# Patient Record
Sex: MALE | Race: BLACK OR AFRICAN AMERICAN | Employment: FULL TIME | URBAN - METROPOLITAN AREA
[De-identification: names, ages, dates, MRNs, and addresses within clinical notes are randomized per-mention and may not be internally consistent; named-entity substitution may affect disease eponyms.]

---

## 2017-01-12 ENCOUNTER — TELEPHONE (OUTPATIENT)
Dept: FAMILY MEDICINE | Facility: CLINIC | Age: 47
End: 2017-01-12

## 2017-01-12 NOTE — TELEPHONE ENCOUNTER
Pt calling to see if Sherron has found anything else out since having his testosterone levels drawn which were in the normal range.  Per results notes she was going to look into something.    Please advise    Charlette Duval RN, BSN

## 2017-01-17 NOTE — TELEPHONE ENCOUNTER
Was there suppose to be some follow up done with his testosterone in the normal limits?  Charlette Duval RN, BSN

## 2019-05-25 ENCOUNTER — VIRTUAL VISIT (OUTPATIENT)
Dept: FAMILY MEDICINE | Facility: OTHER | Age: 49
End: 2019-05-25

## 2019-05-28 ENCOUNTER — HOSPITAL ENCOUNTER (OUTPATIENT)
Facility: CLINIC | Age: 49
Setting detail: OBSERVATION
Discharge: HOME | End: 2019-05-30
Attending: HOSPITALIST | Admitting: UROLOGY
Payer: COMMERCIAL

## 2019-05-28 ENCOUNTER — TRANSFERRED RECORDS (OUTPATIENT)
Dept: HEALTH INFORMATION MANAGEMENT | Facility: CLINIC | Age: 49
End: 2019-05-28

## 2019-05-28 DIAGNOSIS — N23 RENAL COLIC: Primary | ICD-10-CM

## 2019-05-28 PROBLEM — N17.9 ACUTE KIDNEY INJURY (H): Status: ACTIVE | Noted: 2019-05-28

## 2019-05-28 LAB
CREAT SERPL-MCNC: 1.9 MG/DL (ref 0.66–1.25)
GFR SERPL CREATININE-BSD FRML MDRD: 38 ML/MIN/1.73M2
GLUCOSE SERPL-MCNC: 97 MG/DL (ref 74–106)
POTASSIUM SERPL-SCNC: 4.3 MMOL/L (ref 3.5–5.1)

## 2019-05-28 PROCEDURE — 99220 ZZC INITIAL OBSERVATION CARE,LEVL III: CPT | Performed by: PHYSICIAN ASSISTANT

## 2019-05-28 PROCEDURE — G0378 HOSPITAL OBSERVATION PER HR: HCPCS

## 2019-05-28 PROCEDURE — G0379 DIRECT REFER HOSPITAL OBSERV: HCPCS

## 2019-05-28 PROCEDURE — 25800030 ZZH RX IP 258 OP 636: Performed by: PHYSICIAN ASSISTANT

## 2019-05-28 PROCEDURE — 25000132 ZZH RX MED GY IP 250 OP 250 PS 637: Performed by: PHYSICIAN ASSISTANT

## 2019-05-28 RX ORDER — LIDOCAINE 40 MG/G
CREAM TOPICAL
Status: DISCONTINUED | OUTPATIENT
Start: 2019-05-28 | End: 2019-05-30 | Stop reason: HOSPADM

## 2019-05-28 RX ORDER — AMOXICILLIN 250 MG
1 CAPSULE ORAL 2 TIMES DAILY PRN
Status: DISCONTINUED | OUTPATIENT
Start: 2019-05-28 | End: 2019-05-30 | Stop reason: HOSPADM

## 2019-05-28 RX ORDER — SODIUM CHLORIDE 9 MG/ML
INJECTION, SOLUTION INTRAVENOUS CONTINUOUS
Status: DISCONTINUED | OUTPATIENT
Start: 2019-05-28 | End: 2019-05-29

## 2019-05-28 RX ORDER — TAMSULOSIN HYDROCHLORIDE 0.4 MG/1
0.4 CAPSULE ORAL DAILY
Status: DISCONTINUED | OUTPATIENT
Start: 2019-05-28 | End: 2019-05-29

## 2019-05-28 RX ORDER — ONDANSETRON 4 MG/1
4 TABLET, ORALLY DISINTEGRATING ORAL EVERY 6 HOURS PRN
Status: DISCONTINUED | OUTPATIENT
Start: 2019-05-28 | End: 2019-05-30 | Stop reason: HOSPADM

## 2019-05-28 RX ORDER — ACETAMINOPHEN 325 MG/1
650 TABLET ORAL EVERY 4 HOURS PRN
Status: DISCONTINUED | OUTPATIENT
Start: 2019-05-28 | End: 2019-05-30 | Stop reason: HOSPADM

## 2019-05-28 RX ORDER — ONDANSETRON 2 MG/ML
4 INJECTION INTRAMUSCULAR; INTRAVENOUS EVERY 6 HOURS PRN
Status: DISCONTINUED | OUTPATIENT
Start: 2019-05-28 | End: 2019-05-30 | Stop reason: HOSPADM

## 2019-05-28 RX ORDER — OXYCODONE HYDROCHLORIDE 5 MG/1
5-10 TABLET ORAL
Status: DISCONTINUED | OUTPATIENT
Start: 2019-05-28 | End: 2019-05-30 | Stop reason: HOSPADM

## 2019-05-28 RX ORDER — AMOXICILLIN 250 MG
2 CAPSULE ORAL 2 TIMES DAILY PRN
Status: DISCONTINUED | OUTPATIENT
Start: 2019-05-28 | End: 2019-05-30 | Stop reason: HOSPADM

## 2019-05-28 RX ORDER — HYDROMORPHONE HYDROCHLORIDE 1 MG/ML
0.5 INJECTION, SOLUTION INTRAMUSCULAR; INTRAVENOUS; SUBCUTANEOUS
Status: DISCONTINUED | OUTPATIENT
Start: 2019-05-28 | End: 2019-05-30 | Stop reason: HOSPADM

## 2019-05-28 RX ORDER — NALOXONE HYDROCHLORIDE 0.4 MG/ML
.1-.4 INJECTION, SOLUTION INTRAMUSCULAR; INTRAVENOUS; SUBCUTANEOUS
Status: DISCONTINUED | OUTPATIENT
Start: 2019-05-28 | End: 2019-05-30 | Stop reason: HOSPADM

## 2019-05-28 RX ORDER — POLYETHYLENE GLYCOL 3350 17 G/17G
17 POWDER, FOR SOLUTION ORAL DAILY PRN
Status: DISCONTINUED | OUTPATIENT
Start: 2019-05-28 | End: 2019-05-30 | Stop reason: HOSPADM

## 2019-05-28 RX ADMIN — OXYCODONE HYDROCHLORIDE 10 MG: 5 TABLET ORAL at 19:35

## 2019-05-28 RX ADMIN — OXYCODONE HYDROCHLORIDE 10 MG: 5 TABLET ORAL at 23:44

## 2019-05-28 RX ADMIN — SODIUM CHLORIDE: 9 INJECTION, SOLUTION INTRAVENOUS at 21:27

## 2019-05-28 ASSESSMENT — COLUMBIA-SUICIDE SEVERITY RATING SCALE - C-SSRS
1. IN THE PAST MONTH, HAVE YOU WISHED YOU WERE DEAD OR WISHED YOU COULD GO TO SLEEP AND NOT WAKE UP?: NO
2. HAVE YOU ACTUALLY HAD ANY THOUGHTS OF KILLING YOURSELF IN THE PAST MONTH?: NO

## 2019-05-28 NOTE — LETTER
[facility] OBSERVATION DEPARTMENT  [address]  [facility] [address]  [phone]          May 30, 2019    RE:  Russ Stevens                                                                                                                                                       [address]  [facility] [address]            To whom it may concern:    Russ Stevens is under my professional care for medical reasons from 5/28/19-5/30/19. He may return to work on 6/3/19.      Sincerely,    Faina Brar PA-C  [phone]

## 2019-05-29 ENCOUNTER — APPOINTMENT (OUTPATIENT)
Dept: GENERAL RADIOLOGY | Facility: CLINIC | Age: 49
End: 2019-05-29
Attending: UROLOGY
Payer: COMMERCIAL

## 2019-05-29 ENCOUNTER — ANESTHESIA (OUTPATIENT)
Dept: SURGERY | Facility: CLINIC | Age: 49
End: 2019-05-29
Payer: COMMERCIAL

## 2019-05-29 ENCOUNTER — ANESTHESIA EVENT (OUTPATIENT)
Dept: SURGERY | Facility: CLINIC | Age: 49
End: 2019-05-29
Payer: COMMERCIAL

## 2019-05-29 ENCOUNTER — SURGERY (OUTPATIENT)
Age: 49
End: 2019-05-29
Payer: COMMERCIAL

## 2019-05-29 ENCOUNTER — APPOINTMENT (OUTPATIENT)
Dept: GENERAL RADIOLOGY | Facility: CLINIC | Age: 49
End: 2019-05-29
Attending: HOSPITALIST
Payer: COMMERCIAL

## 2019-05-29 LAB
ALBUMIN UR-MCNC: NEGATIVE MG/DL
ANION GAP SERPL CALCULATED.3IONS-SCNC: 6 MMOL/L (ref 3–14)
APPEARANCE UR: CLEAR
BASOPHILS # BLD AUTO: 0 10E9/L (ref 0–0.2)
BASOPHILS NFR BLD AUTO: 0.3 %
BILIRUB UR QL STRIP: NEGATIVE
BUN SERPL-MCNC: 14 MG/DL (ref 7–30)
CALCIUM SERPL-MCNC: 8.4 MG/DL (ref 8.5–10.1)
CHLORIDE SERPL-SCNC: 112 MMOL/L (ref 94–109)
CO2 SERPL-SCNC: 25 MMOL/L (ref 20–32)
COLOR UR AUTO: NORMAL
CREAT SERPL-MCNC: 2.21 MG/DL (ref 0.66–1.25)
DIFFERENTIAL METHOD BLD: ABNORMAL
EOSINOPHIL # BLD AUTO: 0 10E9/L (ref 0–0.7)
EOSINOPHIL NFR BLD AUTO: 0.2 %
ERYTHROCYTE [DISTWIDTH] IN BLOOD BY AUTOMATED COUNT: 12.6 % (ref 10–15)
GFR SERPL CREATININE-BSD FRML MDRD: 34 ML/MIN/{1.73_M2}
GLUCOSE SERPL-MCNC: 86 MG/DL (ref 70–99)
GLUCOSE UR STRIP-MCNC: NEGATIVE MG/DL
HCT VFR BLD AUTO: 43.3 % (ref 40–53)
HGB BLD-MCNC: 14.2 G/DL (ref 13.3–17.7)
HGB UR QL STRIP: NEGATIVE
IMM GRANULOCYTES # BLD: 0 10E9/L (ref 0–0.4)
IMM GRANULOCYTES NFR BLD: 0.2 %
KETONES UR STRIP-MCNC: NEGATIVE MG/DL
LEUKOCYTE ESTERASE UR QL STRIP: NEGATIVE
LYMPHOCYTES # BLD AUTO: 1.6 10E9/L (ref 0.8–5.3)
LYMPHOCYTES NFR BLD AUTO: 23.8 %
MCH RBC QN AUTO: 30.2 PG (ref 26.5–33)
MCHC RBC AUTO-ENTMCNC: 32.8 G/DL (ref 31.5–36.5)
MCV RBC AUTO: 92 FL (ref 78–100)
MONOCYTES # BLD AUTO: 0.9 10E9/L (ref 0–1.3)
MONOCYTES NFR BLD AUTO: 13.2 %
NEUTROPHILS # BLD AUTO: 4.1 10E9/L (ref 1.6–8.3)
NEUTROPHILS NFR BLD AUTO: 62.3 %
NITRATE UR QL: NEGATIVE
NRBC # BLD AUTO: 0 10*3/UL
NRBC BLD AUTO-RTO: 0 /100
PH UR STRIP: 7 PH (ref 5–7)
PLATELET # BLD AUTO: 114 10E9/L (ref 150–450)
POTASSIUM SERPL-SCNC: 4.2 MMOL/L (ref 3.4–5.3)
RBC # BLD AUTO: 4.7 10E12/L (ref 4.4–5.9)
RBC #/AREA URNS AUTO: 1 /HPF (ref 0–2)
SODIUM SERPL-SCNC: 143 MMOL/L (ref 133–144)
SOURCE: NORMAL
SP GR UR STRIP: 1 (ref 1–1.03)
UROBILINOGEN UR STRIP-MCNC: NORMAL MG/DL (ref 0–2)
WBC # BLD AUTO: 6.5 10E9/L (ref 4–11)
WBC #/AREA URNS AUTO: 1 /HPF (ref 0–5)

## 2019-05-29 PROCEDURE — 37000009 ZZH ANESTHESIA TECHNICAL FEE, EACH ADDTL 15 MIN: Performed by: UROLOGY

## 2019-05-29 PROCEDURE — 99203 OFFICE O/P NEW LOW 30 MIN: CPT | Mod: 25 | Performed by: UROLOGY

## 2019-05-29 PROCEDURE — 96365 THER/PROPH/DIAG IV INF INIT: CPT

## 2019-05-29 PROCEDURE — 52332 CYSTOSCOPY AND TREATMENT: CPT | Mod: RT | Performed by: UROLOGY

## 2019-05-29 PROCEDURE — 71000012 ZZH RECOVERY PHASE 1 LEVEL 1 FIRST HR: Performed by: UROLOGY

## 2019-05-29 PROCEDURE — 25000128 H RX IP 250 OP 636: Performed by: NURSE ANESTHETIST, CERTIFIED REGISTERED

## 2019-05-29 PROCEDURE — 85025 COMPLETE CBC W/AUTO DIFF WBC: CPT | Performed by: PHYSICIAN ASSISTANT

## 2019-05-29 PROCEDURE — 25800030 ZZH RX IP 258 OP 636: Performed by: PHYSICIAN ASSISTANT

## 2019-05-29 PROCEDURE — 36000060 ZZH SURGERY LEVEL 3 W FLUORO 1ST 30 MIN: Performed by: UROLOGY

## 2019-05-29 PROCEDURE — 25500064 ZZH RX 255 OP 636: Performed by: UROLOGY

## 2019-05-29 PROCEDURE — 40000306 ZZH STATISTIC PRE PROC ASSESS II: Performed by: UROLOGY

## 2019-05-29 PROCEDURE — 87086 URINE CULTURE/COLONY COUNT: CPT | Performed by: UROLOGY

## 2019-05-29 PROCEDURE — 40000936 ZZH STATISTIC OUTPATIENT (NON-OBS) NIGHT

## 2019-05-29 PROCEDURE — C2617 STENT, NON-COR, TEM W/O DEL: HCPCS | Performed by: UROLOGY

## 2019-05-29 PROCEDURE — 36000058 ZZH SURGERY LEVEL 3 EA 15 ADDTL MIN: Performed by: UROLOGY

## 2019-05-29 PROCEDURE — 25000128 H RX IP 250 OP 636: Performed by: PHYSICIAN ASSISTANT

## 2019-05-29 PROCEDURE — 25000125 ZZHC RX 250: Performed by: NURSE ANESTHETIST, CERTIFIED REGISTERED

## 2019-05-29 PROCEDURE — 99225 ZZC SUBSEQUENT OBSERVATION CARE,LEVEL II: CPT | Performed by: PHYSICIAN ASSISTANT

## 2019-05-29 PROCEDURE — 74420 UROGRAPHY RTRGR +-KUB: CPT | Mod: 26 | Performed by: UROLOGY

## 2019-05-29 PROCEDURE — 40000277 XR SURGERY CARM FLUORO LESS THAN 5 MIN W STILLS: Mod: TC

## 2019-05-29 PROCEDURE — 25000128 H RX IP 250 OP 636: Performed by: UROLOGY

## 2019-05-29 PROCEDURE — 37000008 ZZH ANESTHESIA TECHNICAL FEE, 1ST 30 MIN: Performed by: UROLOGY

## 2019-05-29 PROCEDURE — 25800029 ZZH RX IP 258 OP 250: Performed by: PHYSICIAN ASSISTANT

## 2019-05-29 PROCEDURE — 96366 THER/PROPH/DIAG IV INF ADDON: CPT | Mod: 59

## 2019-05-29 PROCEDURE — 25000132 ZZH RX MED GY IP 250 OP 250 PS 637: Performed by: PHYSICIAN ASSISTANT

## 2019-05-29 PROCEDURE — G0378 HOSPITAL OBSERVATION PER HR: HCPCS

## 2019-05-29 PROCEDURE — 81001 URINALYSIS AUTO W/SCOPE: CPT | Performed by: PHYSICIAN ASSISTANT

## 2019-05-29 PROCEDURE — 36415 COLL VENOUS BLD VENIPUNCTURE: CPT | Performed by: PHYSICIAN ASSISTANT

## 2019-05-29 PROCEDURE — 27210794 ZZH OR GENERAL SUPPLY STERILE: Performed by: UROLOGY

## 2019-05-29 PROCEDURE — 25000125 ZZHC RX 250: Performed by: UROLOGY

## 2019-05-29 PROCEDURE — 80048 BASIC METABOLIC PNL TOTAL CA: CPT | Performed by: PHYSICIAN ASSISTANT

## 2019-05-29 PROCEDURE — C1769 GUIDE WIRE: HCPCS | Performed by: UROLOGY

## 2019-05-29 PROCEDURE — 74019 RADEX ABDOMEN 2 VIEWS: CPT

## 2019-05-29 DEVICE — STENT URETERAL POLARIS ULTRA 6FRX26CM M0061921330: Type: IMPLANTABLE DEVICE | Site: URETER | Status: FUNCTIONAL

## 2019-05-29 RX ORDER — LIDOCAINE HYDROCHLORIDE 10 MG/ML
INJECTION, SOLUTION INFILTRATION; PERINEURAL PRN
Status: DISCONTINUED | OUTPATIENT
Start: 2019-05-29 | End: 2019-05-29

## 2019-05-29 RX ORDER — CEFTRIAXONE 1 G/1
1 INJECTION, POWDER, FOR SOLUTION INTRAMUSCULAR; INTRAVENOUS EVERY 24 HOURS
Status: DISCONTINUED | OUTPATIENT
Start: 2019-05-29 | End: 2019-05-30 | Stop reason: HOSPADM

## 2019-05-29 RX ORDER — CEFAZOLIN SODIUM 1 G/50ML
1 INJECTION, SOLUTION INTRAVENOUS SEE ADMIN INSTRUCTIONS
Status: DISCONTINUED | OUTPATIENT
Start: 2019-05-29 | End: 2019-05-29

## 2019-05-29 RX ORDER — SODIUM CHLORIDE 9 MG/ML
INJECTION, SOLUTION INTRAVENOUS CONTINUOUS
Status: DISCONTINUED | OUTPATIENT
Start: 2019-05-29 | End: 2019-05-29

## 2019-05-29 RX ORDER — DIMENHYDRINATE 50 MG/ML
25 INJECTION, SOLUTION INTRAMUSCULAR; INTRAVENOUS
Status: DISCONTINUED | OUTPATIENT
Start: 2019-05-29 | End: 2019-05-29 | Stop reason: HOSPADM

## 2019-05-29 RX ORDER — MEPERIDINE HYDROCHLORIDE 50 MG/ML
12.5 INJECTION INTRAMUSCULAR; INTRAVENOUS; SUBCUTANEOUS
Status: DISCONTINUED | OUTPATIENT
Start: 2019-05-29 | End: 2019-05-29 | Stop reason: HOSPADM

## 2019-05-29 RX ORDER — ALBUTEROL SULFATE 0.83 MG/ML
2.5 SOLUTION RESPIRATORY (INHALATION) EVERY 4 HOURS PRN
Status: DISCONTINUED | OUTPATIENT
Start: 2019-05-29 | End: 2019-05-29 | Stop reason: HOSPADM

## 2019-05-29 RX ORDER — SODIUM CHLORIDE, SODIUM LACTATE, POTASSIUM CHLORIDE, CALCIUM CHLORIDE 600; 310; 30; 20 MG/100ML; MG/100ML; MG/100ML; MG/100ML
INJECTION, SOLUTION INTRAVENOUS CONTINUOUS
Status: DISCONTINUED | OUTPATIENT
Start: 2019-05-29 | End: 2019-05-29 | Stop reason: HOSPADM

## 2019-05-29 RX ORDER — DEXAMETHASONE SODIUM PHOSPHATE 4 MG/ML
INJECTION, SOLUTION INTRA-ARTICULAR; INTRALESIONAL; INTRAMUSCULAR; INTRAVENOUS; SOFT TISSUE PRN
Status: DISCONTINUED | OUTPATIENT
Start: 2019-05-29 | End: 2019-05-29

## 2019-05-29 RX ORDER — CEFAZOLIN SODIUM 2 G/100ML
2 INJECTION, SOLUTION INTRAVENOUS
Status: DISCONTINUED | OUTPATIENT
Start: 2019-05-29 | End: 2019-05-29

## 2019-05-29 RX ORDER — FENTANYL CITRATE 50 UG/ML
25-50 INJECTION, SOLUTION INTRAMUSCULAR; INTRAVENOUS
Status: DISCONTINUED | OUTPATIENT
Start: 2019-05-29 | End: 2019-05-29 | Stop reason: HOSPADM

## 2019-05-29 RX ORDER — GLYCOPYRROLATE 0.2 MG/ML
INJECTION, SOLUTION INTRAMUSCULAR; INTRAVENOUS PRN
Status: DISCONTINUED | OUTPATIENT
Start: 2019-05-29 | End: 2019-05-29

## 2019-05-29 RX ORDER — CEFAZOLIN SODIUM 2 G/100ML
INJECTION, SOLUTION INTRAVENOUS PRN
Status: DISCONTINUED | OUTPATIENT
Start: 2019-05-29 | End: 2019-05-29

## 2019-05-29 RX ORDER — ONDANSETRON 2 MG/ML
4 INJECTION INTRAMUSCULAR; INTRAVENOUS EVERY 30 MIN PRN
Status: DISCONTINUED | OUTPATIENT
Start: 2019-05-29 | End: 2019-05-29 | Stop reason: HOSPADM

## 2019-05-29 RX ORDER — METOPROLOL TARTRATE 1 MG/ML
1-2 INJECTION, SOLUTION INTRAVENOUS EVERY 5 MIN PRN
Status: DISCONTINUED | OUTPATIENT
Start: 2019-05-29 | End: 2019-05-29 | Stop reason: HOSPADM

## 2019-05-29 RX ORDER — ONDANSETRON 4 MG/1
4 TABLET, ORALLY DISINTEGRATING ORAL EVERY 30 MIN PRN
Status: DISCONTINUED | OUTPATIENT
Start: 2019-05-29 | End: 2019-05-29 | Stop reason: HOSPADM

## 2019-05-29 RX ORDER — PROPOFOL 10 MG/ML
INJECTION, EMULSION INTRAVENOUS PRN
Status: DISCONTINUED | OUTPATIENT
Start: 2019-05-29 | End: 2019-05-29

## 2019-05-29 RX ORDER — SODIUM CHLORIDE 450 MG/100ML
INJECTION, SOLUTION INTRAVENOUS CONTINUOUS
Status: DISCONTINUED | OUTPATIENT
Start: 2019-05-29 | End: 2019-05-30 | Stop reason: HOSPADM

## 2019-05-29 RX ORDER — NALOXONE HYDROCHLORIDE 0.4 MG/ML
.1-.4 INJECTION, SOLUTION INTRAMUSCULAR; INTRAVENOUS; SUBCUTANEOUS
Status: DISCONTINUED | OUTPATIENT
Start: 2019-05-29 | End: 2019-05-29 | Stop reason: HOSPADM

## 2019-05-29 RX ORDER — FENTANYL CITRATE 50 UG/ML
INJECTION, SOLUTION INTRAMUSCULAR; INTRAVENOUS PRN
Status: DISCONTINUED | OUTPATIENT
Start: 2019-05-29 | End: 2019-05-29

## 2019-05-29 RX ORDER — LIDOCAINE 40 MG/G
CREAM TOPICAL
Status: DISCONTINUED | OUTPATIENT
Start: 2019-05-29 | End: 2019-05-30 | Stop reason: HOSPADM

## 2019-05-29 RX ORDER — HYDROMORPHONE HYDROCHLORIDE 1 MG/ML
.3-.5 INJECTION, SOLUTION INTRAMUSCULAR; INTRAVENOUS; SUBCUTANEOUS EVERY 10 MIN PRN
Status: DISCONTINUED | OUTPATIENT
Start: 2019-05-29 | End: 2019-05-29 | Stop reason: HOSPADM

## 2019-05-29 RX ADMIN — SODIUM CHLORIDE: 9 INJECTION, SOLUTION INTRAVENOUS at 10:30

## 2019-05-29 RX ADMIN — CEFAZOLIN SODIUM 2 G: 2 INJECTION, SOLUTION INTRAVENOUS at 11:59

## 2019-05-29 RX ADMIN — MIDAZOLAM 2 MG: 1 INJECTION INTRAMUSCULAR; INTRAVENOUS at 11:43

## 2019-05-29 RX ADMIN — SODIUM CHLORIDE: 9 INJECTION, SOLUTION INTRAVENOUS at 03:59

## 2019-05-29 RX ADMIN — OXYCODONE HYDROCHLORIDE 10 MG: 5 TABLET ORAL at 04:02

## 2019-05-29 RX ADMIN — SODIUM CHLORIDE: 4.5 INJECTION, SOLUTION INTRAVENOUS at 14:02

## 2019-05-29 RX ADMIN — ONDANSETRON 4 MG: 2 INJECTION INTRAMUSCULAR; INTRAVENOUS at 12:04

## 2019-05-29 RX ADMIN — CEFTRIAXONE SODIUM 1 G: 1 INJECTION, POWDER, FOR SOLUTION INTRAMUSCULAR; INTRAVENOUS at 15:14

## 2019-05-29 RX ADMIN — FENTANYL CITRATE 100 MCG: 50 INJECTION, SOLUTION INTRAMUSCULAR; INTRAVENOUS at 11:51

## 2019-05-29 RX ADMIN — WATER 15 ML GIVEN: 100 IRRIGANT IRRIGATION at 12:04

## 2019-05-29 RX ADMIN — DEXAMETHASONE SODIUM PHOSPHATE 4 MG: 4 INJECTION, SOLUTION INTRA-ARTICULAR; INTRALESIONAL; INTRAMUSCULAR; INTRAVENOUS; SOFT TISSUE at 11:51

## 2019-05-29 RX ADMIN — CEFAZOLIN SODIUM 2 G: 2 INJECTION, SOLUTION INTRAVENOUS at 11:55

## 2019-05-29 RX ADMIN — PROPOFOL 150 MG: 10 INJECTION, EMULSION INTRAVENOUS at 11:51

## 2019-05-29 RX ADMIN — LIDOCAINE HYDROCHLORIDE 30 MG: 10 INJECTION, SOLUTION INFILTRATION; PERINEURAL at 11:51

## 2019-05-29 RX ADMIN — OXYCODONE HYDROCHLORIDE 10 MG: 5 TABLET ORAL at 09:07

## 2019-05-29 RX ADMIN — TAMSULOSIN HYDROCHLORIDE 0.4 MG: 0.4 CAPSULE ORAL at 08:50

## 2019-05-29 RX ADMIN — GLYCOPYRROLATE 0.2 MG: 0.2 INJECTION, SOLUTION INTRAMUSCULAR; INTRAVENOUS at 11:51

## 2019-05-29 ASSESSMENT — MIFFLIN-ST. JEOR: SCORE: 1744.89

## 2019-05-29 NOTE — PROGRESS NOTES
"Pt arrived back on unit from procedure BP 97/56 (BP Location: Left arm)   Pulse 76   Temp 98.4  F (36.9  C) (Oral)   Resp 24   Ht 1.753 m (5' 9\")   Wt 88.5 kg (195 lb)   SpO2 95%   BMI 28.80 kg/m    "

## 2019-05-29 NOTE — PLAN OF CARE
PRIMARY DIAGNOSIS: ACUTE RENAL COLIC    OUTPATIENT/OBSERVATION GOALS TO BE MET BEFORE DISCHARGE  1. Pain Status: Improved-controlled with oral pain medications.    2. Tolerating adequate PO diet: NPO, ice chips/meds overnight.     3. Surgical Intervention planned: consult pending    4. Cleared by consultants (if involved): No    5. Return to near baseline physical activity: Yes    Discharge Planner Nurse   Safe discharge environment identified: Yes  Barriers to discharge: Yes       Entered by: Estrella Ochoa 05/29/2019 1:39 AM     Please review provider order for any additional goals.   Nurse to notify provider when observation goals have been met and patient is ready for discharge.    VSS. Afebrile. 9/10 right flank pain radiating to abdomen. Well controlled with oral pain medication. Oxycodone x1. Heat to back. Denies nausea. Straining urine. No stones noted. Up independently.

## 2019-05-29 NOTE — ANESTHESIA CARE TRANSFER NOTE
Patient: Russ Stevens    Procedure(s):  Cystoscopy, with Right Ureteroscopy with holmiun laser on standby, Right stent insertion.    Diagnosis: kidney stone  Diagnosis Additional Information: No value filed.    Anesthesia Type:   General, ETT     Note:  Airway :Face Mask  Patient transferred to:PACU  Comments:   Spontaneous respirations, oral suctioned, bilateral eye opening and hand grasps.  Extubated to FM O2 6lpm.  VSS to PACU.Handoff Report: Identifed the Patient, Identified the Reponsible Provider, Reviewed the pertinent medical history, Discussed the surgical course, Reviewed Intra-OP anesthesia mangement and issues during anesthesia, Set expectations for post-procedure period and Allowed opportunity for questions and acknowledgement of understanding      Vitals: (Last set prior to Anesthesia Care Transfer)    CRNA VITALS  5/29/2019 1143 - 5/29/2019 1223      5/29/2019             NIBP:  128/78    Pulse:  79    SpO2:  99 %    Resp Rate (observed):  16    EKG:  Sinus rhythm                Electronically Signed By: JASEN Bay CRNA  May 29, 2019  12:23 PM

## 2019-05-29 NOTE — OP NOTE
OPERATIVE NOTE    PREOPERATIVE DIAGNOSIS:  Right ureteral stone    POSTOPERATIVE DIAGNOSIS:  Same    PROCEDURES PERFORMED:   1. Cystourethroscopy  2. Right retrograde pyelogram with interpretation of intraoperative fluoroscopic imaging  3. Right ureteral stent placement    STAFF SURGEON:  Grey Gaspar MD  ASSISTANT:   Damion Cueto MD    ANESTHESIA:  General    ESTIMATED BLOOD LOSS: 1 cc    DRAINS/TUBES:  6 [place] x 26 cm double-J ureteral stent    SPECIMEN:   Urine for culture    SIGNIFICANT FINDINGS: Turbid, purulent drainage from ureter upon passage of wire concerning for infection. Stent placed; ureteroscopy not performed.  Proximal curl of the ureteral stent seen in the renal pelvis under fluoroscopy and distal curl seen in the bladder fluoroscopically and under direct vision.     BRIEF OPERATIVE INDICATIONS:  Russ Stevens is a(n) 48 year old male with a symptomatic 2mm right distal ureteral stone and ILIANA.  After a discussion of all risks, benefits, and alternatives, the patient elected to proceed with ureteroscopy and ureteral stent placement.     DESCRIPTION OF PROCEDURE:  After informed consent was obtained, the patient was transported to the operating room & placed supine on the table. Pneumoboots were applied.  After adequate anesthesia was induced, he was placed in lithotomy and prepped and draped in the usual sterile fashion. A timeout was taken to confirm correct patient, procedure and laterality. Pre-operative IV antibiotics were administered.     A 22-[place] Korean rigid cystoscope was inserted into a well-lubricated urethra. The anterior urethra was unremarkable, and the prostate showed no obstruction. The bladder was surveilled and was unremarkable.  The right ureteral orifice was identified and cannulated with a Glid wire and 5 [place] open-ended catheter. A retrograde pyelogram was obtained which showed mild hydronephrosis. There was drainage of turbid, purulent urine after passage of the wire  concerning for infection. We therefore elected not to proceed with ureteroscopy and to place a stent only. The wire was replaced and a 6 Fr x 26-cm double-J stent was advanced over the wire. Good proximal curl was seen in the renal pelvis fluoroscopically and the distal curl was seen in the bladder fluoroscopically and under direct vision.     The bladder was drained.  The patient tolerated the procedure well and there were no apparent complications. The patient  was transported to the postanesthesia care unit in stable condition.     POST-OPERATIVE PLAN: Following recovery in the PACU, the patient will return to the floor.  Recommend observation x24 hours on broad spectrum antibiotics (ceftriaxone 1g ordered) given evidence of infection with stent placement. Will schedule in 2-3 weeks for ureteroscopy after completion of at least 7-10 days of culture appropriate antibiotics.

## 2019-05-29 NOTE — PLAN OF CARE
Patient No change    PRIMARY DIAGNOSIS: ACUTE RENAL COLIC    OUTPATIENT/OBSERVATION GOALS TO BE MET BEFORE DISCHARGE  1. Pain Status: Improved-controlled with oral pain medications.    2. Tolerating adequate PO diet: Yes    3. Surgical Intervention planned: Urology consult    4. Cleared by consultants (if involved): No    5. Return to near baseline physical activity: Yes    Vitals are Temp: 97  F (36.1  C) Temp src: Oral BP: 129/87 Pulse: 77   Resp: 18 SpO2: 96 %.  Patient is Alert and Oriented x4. They are independent with Activity.  Pt is a Regular diet and NPO .  They are complaining of 9/10 pain in their right side.  Oxycodone given for pain.  Patient has no IV access and attempting to get IV access.  We are straining the urine.  Plan of care is a urology consult.    Discharge Planner Nurse   Safe discharge environment identified: Yes  Barriers to discharge: Yes            Please review provider order for any additional goals.   Nurse to notify provider when observation goals have been met and patient is ready for discharge.

## 2019-05-29 NOTE — PLAN OF CARE
PRIMARY DIAGNOSIS: ACUTE RENAL COLIC    OUTPATIENT/OBSERVATION GOALS TO BE MET BEFORE DISCHARGE  1. Pain Status: Improved-controlled with oral pain medications.    2. Tolerating adequate PO diet: NPO, ice chips/meds overnight.     3. Surgical Intervention planned: consult pending    4. Cleared by consultants (if involved): No    5. Return to near baseline physical activity: Yes    Discharge Planner Nurse   Safe discharge environment identified: Yes  Barriers to discharge: Yes       Entered by: Estrella Ochoa 05/29/2019 4:28 AM     Please review provider order for any additional goals.   Nurse to notify provider when observation goals have been met and patient is ready for discharge.    VSS. Afebrile. 9/10 right flank pain radiating to abdomen. Well controlled with oral pain medication. Oxycodone x2. Heat to back. Denies nausea. Straining urine. No stones noted. Up independently.

## 2019-05-29 NOTE — PROGRESS NOTES
"[facility]  Observation Unit  Progress Note    Date of Service: 5/29/2019    Patient: Russ Stevens  MRN: [id number]  Admission Date: 5/28/2019  Hospital Day # 2    Assessment & Plan: Russ Stevens is a 48 year old male with no significant past medical history who presented as a direct admission from the Urgency Room due to ILIANA and Nephrolithiasis.    Acute Renal Colic - pt presented to the Urgency Room on 5/25 with right flank pain.  CT abd/pelvis revealed 2mm calculus in the distal right ureter with right hydronephrosis and right hydroureter, non obstructing stone in the right and left kidney.  Creatinine 1.3.  Patient returned to the UR on 5/28 with ongoing pain and rising creatinine to 1.9 and negative UA.  This morning, patient with rising creatinine to 2.21 and negative UA.  Urology consulted and patient is s/p Cystourethroscopy, Right Retrograde Pyelogram and Right Ureteral Stent placement.  Turbid, purulent drainage from the ureter was noted during the procedure, therefore IV Ceftriaxone and return to the observation unit and monitoring overnight was recommended.  - continue IVF hydration  - urine culture pending  - continue IV Ceftriaxone    ILIANA - 2/2 obstructing stone.  Creatinine today 2.21 up from baseline 1.06  - continue IVF hydration  - repeat BMP in am       CODE: Full  DVT: ambulation  Diet/fluids: regular, 1/2NS    Aarti Flores MS, PA-C  Hospitalist Physician Assistant  [facility]  Pager: [phone]      Subjective & Interval Hx:    Patient reports ongoing right flank pain.  Denies nausea or emesis.      Last 24 hr care team notes reviewed.   ROS:  4 point ROS including Respiratory, CV, GI and , other than that noted in the HPI, is negative.    Physical Exam:    Blood pressure 121/76, pulse 68, temperature (P) 97.4  F (36.3  C), temperature source (P) Temporal, resp. rate 18, height 1.753 m (5' 9\"), weight 88.5 kg (195 lb), SpO2 95 %.  General: Alert, interactive, " NAD, lying in bed, pleasant and cooperative.  Resp: clear to auscultation bilaterally, no crackles or wheezes  Cardiac: regular rate and rhythm, no murmur  Abdomen: Soft, right flank tenderness, nondistended. +BS.  No HSM or masses, no rebound or guarding.  Extremities: No LE edema  Skin: Warm and dry  Neuro: Alert & oriented x 3, moves all extremities equally    Labs & Images:  Reviewed in Epic   Medications:    Current Facility-Administered Medications   Medication     [Auto Hold] acetaminophen (TYLENOL) tablet 650 mg     albuterol (PROVENTIL) neb solution 2.5 mg     ceFAZolin (ANCEF) intermittent infusion 1 g     ceFAZolin (ANCEF) intermittent infusion 2 g in 100 mL dextrose PRE-MIX     dimenhyDRINATE (DRAMAMINE) injection 25 mg     fentaNYL (PF) (SUBLIMAZE) injection 25-50 mcg     fentaNYL (PF) (SUBLIMAZE) injection 25-50 mcg     HYDROmorphone (PF) (DILAUDID) injection 0.3-0.5 mg     [Auto Hold] HYDROmorphone (PF) (DILAUDID) injection 0.5 mg     iopamidol 61% (ISOVUE 300) 50 mL + sterile water for irrigation 50 mL     lactated ringers infusion     lidocaine (LMX4) cream     [Auto Hold] lidocaine (LMX4) cream     lidocaine 1 % 0.1-1 mL     [Auto Hold] lidocaine 1 % 0.1-1 mL     [Auto Hold] melatonin tablet 1 mg     meperidine (DEMEROL) injection 12.5 mg     metoprolol (LOPRESSOR) injection 1-2 mg     naloxone (NARCAN) injection 0.1-0.4 mg     [Auto Hold] naloxone (NARCAN) injection 0.1-0.4 mg     ondansetron (ZOFRAN-ODT) ODT tab 4 mg    Or     ondansetron (ZOFRAN) injection 4 mg     [Auto Hold] ondansetron (ZOFRAN-ODT) ODT tab 4 mg    Or     [Auto Hold] ondansetron (ZOFRAN) injection 4 mg     ORAL Pain Medications -  may administer as ordered by surgeon for take home use     [Auto Hold] oxyCODONE (ROXICODONE) tablet 5-10 mg     [Auto Hold] polyethylene glycol (MIRALAX/GLYCOLAX) Packet 17 g     prochlorperazine (COMPAZINE) injection 10 mg     [Auto Hold] senna-docusate (SENOKOT-S/PERICOLACE) 8.6-50 MG per tablet 1  tablet    Or     [Auto Hold] senna-docusate (SENOKOT-S/PERICOLACE) 8.6-50 MG per tablet 2 tablet     sodium chloride (PF) 0.9% PF flush 3 mL     sodium chloride (PF) 0.9% PF flush 3 mL     [Auto Hold] sodium chloride (PF) 0.9% PF flush 3 mL     [Auto Hold] sodium chloride (PF) 0.9% PF flush 3 mL     sodium chloride 0.9% infusion     sodium chloride 0.9% infusion     [Auto Hold] tamsulosin (FLOMAX) capsule 0.4 mg     Facility-Administered Medications Ordered in Other Encounters   Medication     ceFAZolin (ANCEF) intermittent infusion 2 g in 100 mL dextrose PRE-MIX     dexamethasone (DECADRON) injection     fentaNYL (PF) (SUBLIMAZE) injection     glycopyrrolate (ROBINUL) injection     lidocaine 1 % injection     midazolam (VERSED) injection     propofol (DIPRIVAN) injection 10 mg/mL vial

## 2019-05-29 NOTE — PLAN OF CARE
PRIMARY DIAGNOSIS: ACUTE RENAL COLIC    OUTPATIENT/OBSERVATION GOALS TO BE MET BEFORE DISCHARGE  1. Pain Status: Improved-controlled with oral pain medications.    2. Tolerating adequate PO diet: NPO for procedure     3. Surgical Intervention planned: Yes    4. Cleared by consultants (if involved): No    5. Return to near baseline physical activity: Yes    Discharge Planner Nurse   Safe discharge environment identified: Yes  Barriers to discharge: Yes       Entered by: Ashanti Peters 05/29/2019    AOx4. Up ad eric. Pain tolerable with PRN oxy. Fluids at 150. Straining urine. Febrile-100.3 o/w VSS. NPO for urological procedure today-OR time of 1100.   /70 (BP Location: Left arm)   Pulse 68   Temp 100.6  F (38.1  C) (Oral)   Resp 18   SpO2 97%   Please review provider order for any additional goals.   Nurse to notify provider when observation goals have been met and patient is ready for discharge.

## 2019-05-29 NOTE — PLAN OF CARE
"PRIMARY DIAGNOSIS: s/p cysto with stent placement  OUTPATIENT/OBSERVATION GOALS TO BE MET BEFORE DISCHARGE:  1. Stable vital signs: Yes  2. Tolerating diet: Yes  3. Pain controlled with oral pain medications: Yes  4. Positive bowel sounds: Yes  5. Voiding without difficulty: Yes, reported mild dysuria, voided 250mL with christa-colored urine output  6. Able to ambulate: Yes  7. Provider specific discharge goals met: Yes    Discharge Planner Nurse   Safe discharge environment identified: Yes  Barriers to discharge: No       Entered by: Jasmyn Givens 05/29/2019 4:47 PM     Please review provider order for any additional goals.   Nurse to notify provider when observation goals have been met and patient is ready for discharge.    BP 97/53 (BP Location: Left arm)   Pulse 76   Temp 97.3  F (36.3  C) (Oral)   Resp 20   Ht 1.753 m (5' 9\")   Wt 88.5 kg (195 lb)   SpO2 93%   BMI 28.80 kg/m      Orientation: A&Ox4  Neurological: in tact  Pain status: denies pain; however, reports some pain/burning when voiding  Activity: SBA for long distances, safe to ambulate independently in room  Peripheral neurovascular: no edema noted in BLE, denies numbness/tingling BUE/BLE  Resp: no signs of respiratory distress ntoed  Lungs: clear on auscultation  Cardiac: WNL  GI: WNL - bowel sounds present in all 4Qs  : voiding adequately, reporting some mild discomfort/dysuria when voiding, christa/red-colored output  Skin: in tact  IVF: 1/2 NS 100mL/hr  Diet: regular - tolerating  Consults: urology following  Plan: IV rocephin, continue IVF, repeat BMP in AM, pain control  "

## 2019-05-29 NOTE — PHARMACY-ADMISSION MEDICATION HISTORY
Medication Reconciliation is completed.  Patient is currently not taking any medications prior to this admission.                   May 28, 2019                    Angel Wu

## 2019-05-29 NOTE — PLAN OF CARE
ROOM # 208-2    Living Situation (if not independent, order SW consult): home alone in apt  Facility name:  :   [facility] Spouse [phone]         Activity level at baseline: Ind  Activity level on admit: Ind      Patient registered to observation; given Patient Bill of Rights; given the opportunity to ask questions about observation status and their plan of care.  Patient has been oriented to the observation room, bathroom and call light is in place.    Discussed discharge goals and expectations with patient/family.

## 2019-05-29 NOTE — ANESTHESIA POSTPROCEDURE EVALUATION
Patient: Russ Stevens    Procedure(s):  Cystoscopy, with Right Ureteroscopy with holmiun laser on standby, Right stent insertion.    Diagnosis:kidney stone  Diagnosis Additional Information:            Right ureteral stone    Anesthesia Type:  General, ETT    Note:  Anesthesia Post Evaluation    Patient location during evaluation: PACU  Patient participation: Able to fully participate in evaluation  Level of consciousness: awake  Pain management: adequate  Airway patency: patent  Cardiovascular status: acceptable  Respiratory status: acceptable  Hydration status: acceptable  PONV: controlled     Anesthetic complications: None          Last vitals:  Vitals:    05/29/19 1245 05/29/19 1300 05/29/19 1334   BP: 124/86 (!) 134/93 97/56   Pulse:      Resp: 13 15 24   Temp:  97.5  F (36.4  C) 98.4  F (36.9  C)   SpO2: 100% 97% 95%         Electronically Signed By: Jayjay Daily DO  May 29, 2019  1:37 PM

## 2019-05-29 NOTE — CONSULTS
"Consult Date:  05/29/2019      UROLOGY INPATIENT CONSULTATION      REASON FOR CONSULTATION:  Right ureteral stone and right kidney stone.      HISTORY OF PRESENT ILLNESS:  Russ Stevens is a 48-year-old gentleman with no prior stone history who was the \"urgency room\" in [place] 4 days ago with right-sided flank pain.  A CT scan performed in the urgency room showed a 2 mm distal right ureteral stone.  There was also no stone up in his right kidney.  He was discharged with pain medication.  He said that his pain remained resolved after that visit until yesterday morning when it returned.  He came back to the Emergency Department here and then was admitted for pain control.  He has continued to require pain control in the observation unit overnight.  He has had no fevers, chills, nausea or vomiting.  He had a normal white blood cell count upon admission, and an updated urinalysis is pending.      PAST MEDICAL HISTORY:  He has no prior history of kidney stones.      PAST SURGICAL HISTORY:  No prior surgeries.        HOME MEDICATIONS:  He normally takes no medications at home.      ALLERGIES:  NO KNOWN DRUG ALLERGIES.      SOCIAL HISTORY:  He is a nonsmoker.      FAMILY HISTORY:  No family history of kidney stones or urologic malignancy.      REVIEW OF SYSTEMS:  Negative other than the right flank pain.      PHYSICAL EXAMINATION:   VITAL SIGNS:  He is currently afebrile and his vital signs are stable.   GENERAL:  Currently alert and oriented and in no acute distress.   HEENT:  Normocephalic and atraumatic.   RESPIRATORY:  Normal nonlabored breathing.   ABDOMEN:  Soft, nontender, nondistended.      IMAGING STUDIES:  I only have the report at this time from the urgency room, but reported a 2 mm stone in the distal right ureter and a 7 mm stone in the right kidney.      IMPRESSION AND PLAN:  This is a 48-year-old gentleman with a small distal right ureteral stone and another stone in the right kidney.  We discussed trial of " passage versus treating the stone for a distal right ureteral stone.  He has had a strong recurrence of his pain and it has been 4 days since diagnosis, so he would like to have the stone treated.  I recommended a cystoscopy with right-sided ureteroscopy and stone extraction and stent placement.  I discussed the procedure in detail today along with its risks and expected recovery.  We will try to get a urinalysis today and also a KUB today.  The remaining right kidney stone may be amenable to shock wave lithotripsy in the future.  He will be n.p.o. in anticipation of a procedure later today.         DAKOTA VALDIVIA MD             D: 2019   T: 2019   MT: KELSEY      Name:     ANNE-MARIE CROW   MRN:      [id number]        Account:       [id number]   :      1970           Consult Date:  2019      Document: [id number]

## 2019-05-29 NOTE — ANESTHESIA PREPROCEDURE EVALUATION
Anesthesia Pre-Procedure Evaluation    Patient: Russ Stevens   MRN: [id number] : 1970          Preoperative Diagnosis: kidney stone    Procedure(s):  Cystoscopy, Right Ureteroscopy with holmiun laser,Right stent insertion.    Past Medical History:   Diagnosis Date     NO ACTIVE PROBLEMS      Past Surgical History:   Procedure Laterality Date     [facility] H[facility] -       [facility]     SURGICAL HISTORY OF -       rt knee surgery     Anesthesia Evaluation     .             ROS/MED HX    ENT/Pulmonary:  - neg pulmonary ROS     Neurologic:  - neg neurologic ROS     Cardiovascular:  - neg cardiovascular ROS       METS/Exercise Tolerance:     Hematologic:  - neg hematologic  ROS       Musculoskeletal:  - neg musculoskeletal ROS       GI/Hepatic:  - neg GI/hepatic ROS       Renal/Genitourinary:     (+) Nephrolithiasis ,       Endo: Comment: .Body mass index is 28.8 kg/m .   - neg endo ROS       Psychiatric:  - neg psychiatric ROS       Infectious Disease:  - neg infectious disease ROS       Malignancy:         Other:    - neg other ROS                      Physical Exam  Normal systems: cardiovascular and pulmonary    Airway   Mallampati: II    Dental     Cardiovascular   Rhythm and rate: regular and normal      Pulmonary    breath sounds clear to auscultation            Lab Results   Component Value Date    WBC 6.5 2019    HGB 14.2 2019    HCT 43.3 2019     (L) 2019     2019    POTASSIUM 4.2 2019    CHLORIDE 112 (H) 2019    CO2 25 2019    BUN 14 2019    CR 2.21 (H) 2019    GLC 86 2019    SMITA 8.4 (L) 2019    ALBUMIN 4.4 2010    PROTTOTAL 7.3 2010    ALT 30 2010    AST 27 2010    ALKPHOS 110 2010    BILITOTAL 0.5 2010    INR 0.91 2010    TSH 0.86 2010       Preop Vitals  BP Readings from Last 3 Encounters:   19 118/70   16 100/60   11/18/15 122/82    Pulse  "Readings from Last 3 Encounters:   05/29/19 68   12/30/16 78   08/20/15 73      Resp Readings from Last 3 Encounters:   05/29/19 18   08/20/15 18   02/01/13 18    SpO2 Readings from Last 3 Encounters:   05/29/19 97%   12/30/16 98%   08/20/15 98%      Temp Readings from Last 1 Encounters:   05/29/19 100.6  F (38.1  C) (Oral)    Ht Readings from Last 1 Encounters:   12/30/16 1.778 m (5' 10\")      Wt Readings from Last 1 Encounters:   12/30/16 102.2 kg (225 lb 3.2 oz)    Estimated body mass index is 32.31 kg/m  as calculated from the following:    Height as of 12/30/16: 1.778 m (5' 10\").    Weight as of 12/30/16: 102.2 kg (225 lb 3.2 oz).       Anesthesia Plan      History & Physical Review  History and physical reviewed and following examination; no interval change.    ASA Status:  1 .        Plan for General and ETT with Intravenous and Propofol induction. Maintenance will be Inhalation and Balanced.    PONV prophylaxis:  Ondansetron (or other 5HT-3) and Dexamethasone or Solumedrol       Postoperative Care      Consents  Anesthetic plan, risks, benefits and alternatives discussed with:  Patient or representative..                 Jayjay Daily DO                    .  "

## 2019-05-29 NOTE — H&P
[facility] Outpatient / Observation Unit  History and Physical Exam     Russ Stevens MRN# [id number]   YOB: 1970 Age: 48 year old      Date of Admission:  5/28/2019    Primary care provider: No primary care provider on file.          Assessment:   Russ Stevens is a 48 year old male with no significant PMH, who presents with complaints of R flank pain. He was diagnosed with a 2 mm distal R ureteral stone on 5/25.  Work up in the Urgency Room reveals: stable VS. Cr elevated at 1.9, otherwise unremarkable. CBC is unremarkable. UA unremarkable.   Patient will be registered to Observation for further evaluation and symptom management for acute renal colic.     1. Acute renal colic - 2 mm R distal ureteral stone, dx on 5/25, pain resolved at home on 5/26 but returned this morning and has been constant. Denies fever, dysuria or hematuria. Good chance of passing stone. Aggressive IVFs, pain control, Flomax and Urology consult.   2. ILIANA - Cr 1.3 on 5/25, now 1.9. Kidney stone is obstructing causing hydronephrosis. Tolerating PO intake, able to urinate. Will give IVFs and recheck in AM           Plan:     1. [place] to Observation  2. Aggressive IV hydration with Normal saline, @, 150 ml/hr   3. Cont supportive care with anti-emetics and pain control (no NSAIDs)  4. Strain Urine, send stone for analysis if applicable  5. Initiate Flomax  6. Urology consultation  7. Regular diet, NPO after midnight  8. DVT prophylaxis: pt at low risk, encourage ambulation                Chief Complaint:   R flank pain         History of Present Illness:   Russ Stevens is a 48 year old male with no significant PMH, who presents with complaints of R flank pain. He was diagnosed with a 2 mm distal R ureteral stone on 5/25. Patient presented to U[facility]ency Room on 5/25 due to right flank pain. He was diagnosed with a 2 mm R distal ureteral stone with hydronephrosis. He was discharged home with pain medication, antiemetics and Flomax. His pain  went away on 5/26 but returned again this morning and has remained constant. He notes having to give extra pressure to urinate but denies any other problems with urination. He denies fever, chills, chest pain, SOB, nausea, vomiting, diarrhea, constipation, dysuria or hematuria. He notes mild intermittent generalized abd pain. He returned to U[facility]ency Room due to sx. His Cr was elevated today on evaluation prompting admission to the hospital.                   Past Medical History:     Past Medical History:   Diagnosis Date     NO ACTIVE PROBLEMS    no significant PMHx            Past Surgical History:     Past Surgical History:   Procedure Laterality Date     SURGICAL HISTORY OF -       cyst removal from [place]     SURGICAL HISTORY OF -       rt knee surgery               Social History:     Social History     Socioeconomic History     Marital status:      Spouse name: Not on file     Number of children: Not on file     Years of education: Not on file     Highest education level: Not on file   Occupational History     Not on file   Social Needs     Financial resource strain: Not on file     Food insecurity:     Worry: Not on file     Inability: Not on file     Transportation needs:     Medical: Not on file     Non-medical: Not on file   Tobacco Use     Smoking status: Never Smoker     Smokeless tobacco: Never Used   Substance and Sexual Activity     Alcohol use: Yes     Comment: rare     Drug use: No     Sexual activity: Yes     Partners: Female     Birth control/protection: Surgical     Comment: vasectomy   Lifestyle     Physical activity:     Days per week: Not on file     Minutes per session: Not on file     Stress: Not on file   Relationships     Social connections:     Talks on phone: Not on file     Gets together: Not on file     Attends Christian service: Not on file     Active member of club or organization: Not on file     Attends meetings of clubs or organizations: Not on file     Relationship  status: Not on file     Intimate partner violence:     Fear of current or ex partner: Not on file     Emotionally abused: Not on file     Physically abused: Not on file     Forced sexual activity: Not on file   Other Topics Concern     Parent/sibling w/ CABG, MI or angioplasty before 65F 55M? No   Social History Narrative     Not on file               Family History:     Family History   Problem Relation Age of Onset     Heart Disease Maternal Grandfather      Family History Negative Mother      Family History Negative Father          in MVA     Family History Negative Brother         4              Allergies:      Allergies   Allergen Reactions     No Known Allergies                Medications:     Oxycodone 5 mg 1-2 tablets PO every 4 hrs as needed for pain  zofran 4 mg PO every 6 hrs as needed for nausea  Flomax 0.4 mg PO daily  Ketorolac 10 mg PO every 6 hrs as needed for pain         Review of Systems:   A Comprehensive greater than 10 system review of systems was carried out.  Pertinent positives and negatives are noted above.  Otherwise negative for contributory information.     Constitutional, neuro, ENT, endocrine, pulmonary, cardiac, gastrointestinal, genitourinary, musculoskeletal, integument and psychiatric systems are negative, except as otherwise noted.         Physical Exam:   Blood pressure 129/87, pulse 77, temperature 97  F (36.1  C), temperature source Oral, resp. rate 18, SpO2 96 %.    GENERAL:  Comfortable.  PSYCH: pleasant, oriented, No acute distress.  HEENT:  Atraumatic, normocephalic. Normal conjunctiva, normal hearing, and oropharynx is normal.  NECK:  Supple, no neck vein distention  HEART:  Normal S1, S2 with no murmur, no pericardial rub, gallops or S3 or S4.  LUNGS:  Clear to auscultation, normal Respiratory effort. No wheezing, rales or ronchi.  GI:  Soft, normal bowel sounds. R CVA and flank tenderness, non distended.   EXTREMITIES:  Able to ambulate independently  SKIN:  Dry to  touch, No rash, wound or ulcerations.  NEUROLOGIC:  CN 2-12 intact, BL 5/5 symmetric upper and lower extremity strength, sensation is intact with no focal deficits.              Data:     Labs and imaging reviewed from [facility]ency Room    Estrella Moseley PA-C

## 2019-05-30 VITALS
HEIGHT: 69 IN | BODY MASS INDEX: 28.88 KG/M2 | HEART RATE: 73 BPM | DIASTOLIC BLOOD PRESSURE: 74 MMHG | SYSTOLIC BLOOD PRESSURE: 120 MMHG | RESPIRATION RATE: 20 BRPM | WEIGHT: 195 LBS | OXYGEN SATURATION: 97 % | TEMPERATURE: 96.6 F

## 2019-05-30 LAB
ANION GAP SERPL CALCULATED.3IONS-SCNC: 4 MMOL/L (ref 3–14)
BACTERIA SPEC CULT: NO GROWTH
BUN SERPL-MCNC: 15 MG/DL (ref 7–30)
CALCIUM SERPL-MCNC: 8.8 MG/DL (ref 8.5–10.1)
CHLORIDE SERPL-SCNC: 108 MMOL/L (ref 94–109)
CO2 SERPL-SCNC: 29 MMOL/L (ref 20–32)
CREAT SERPL-MCNC: 1.41 MG/DL (ref 0.66–1.25)
GFR SERPL CREATININE-BSD FRML MDRD: 58 ML/MIN/{1.73_M2}
GLUCOSE SERPL-MCNC: 91 MG/DL (ref 70–99)
POTASSIUM SERPL-SCNC: 4 MMOL/L (ref 3.4–5.3)
SODIUM SERPL-SCNC: 141 MMOL/L (ref 133–144)
SPECIMEN SOURCE: NORMAL

## 2019-05-30 PROCEDURE — G0378 HOSPITAL OBSERVATION PER HR: HCPCS

## 2019-05-30 PROCEDURE — 96366 THER/PROPH/DIAG IV INF ADDON: CPT

## 2019-05-30 PROCEDURE — 99217 ZZC OBSERVATION CARE DISCHARGE: CPT | Performed by: PHYSICIAN ASSISTANT

## 2019-05-30 PROCEDURE — 25000128 H RX IP 250 OP 636: Performed by: UROLOGY

## 2019-05-30 PROCEDURE — 80048 BASIC METABOLIC PNL TOTAL CA: CPT | Performed by: PHYSICIAN ASSISTANT

## 2019-05-30 PROCEDURE — 36415 COLL VENOUS BLD VENIPUNCTURE: CPT | Performed by: PHYSICIAN ASSISTANT

## 2019-05-30 RX ORDER — SULFAMETHOXAZOLE/TRIMETHOPRIM 800-160 MG
1 TABLET ORAL 2 TIMES DAILY
Qty: 20 TABLET | Refills: 0 | Status: SHIPPED | OUTPATIENT
Start: 2019-05-30 | End: 2019-06-13

## 2019-05-30 RX ADMIN — CEFTRIAXONE SODIUM 1 G: 1 INJECTION, POWDER, FOR SOLUTION INTRAMUSCULAR; INTRAVENOUS at 16:24

## 2019-05-30 NOTE — CONSULTS
Discharge Planner   Discharge Plans in progress: Yes.   Barriers to discharge plan: Patient is currently not established with a PCP. Patient is agreeable to scheduling with [facility].   Follow up plan: Appointment scheduled to establish care and for post hospitalization and updated to AVS.        Entered by: Arabella Harris 05/30/2019 2:16 PM       Arabella TONY  Care Transition Services  [phone]

## 2019-05-30 NOTE — PLAN OF CARE
PRIMARY DIAGNOSIS: ACUTE RENAL COLIC    OUTPATIENT/OBSERVATION GOALS TO BE MET BEFORE DISCHARGE  1. Pain Status: Pain free.    2. Tolerating adequate PO diet: Yes    3. Surgical Intervention planned: Completed     4. Cleared by consultants (if involved): Yes    5. Return to near baseline physical activity: Yes    Discharge Planner Nurse   Safe discharge environment identified: Yes  Barriers to discharge: No       Entered by: Darien Quiros 05/30/2019 5:10 AM     Please review provider order for any additional goals.   Nurse to notify provider when observation goals have been met and patient is ready for discharge.  Temp: 96.9  F (36.1  C) Temp src: Oral BP: 120/80 Pulse: 76 Heart Rate: 58 Resp: 12 SpO2: 98 % O2 Device: None (Room air) Oxygen Delivery: 6 LPM  Pt A&Ox4. States pain with urination. Denies N/V/D, numbness, or tingling. Straining urine. PIV- 1/2 NS infusing @ 100 mL/hr. Creatinine- 2.21. UA negative.

## 2019-05-30 NOTE — PLAN OF CARE
PRIMARY DIAGNOSIS: ACUTE RENAL COLIC    OUTPATIENT/OBSERVATION GOALS TO BE MET BEFORE DISCHARGE  1. Pain Status: Pain free.    2. Tolerating adequate PO diet: Yes    3. Surgical Intervention planned: Completed     4. Cleared by consultants (if involved): Yes    5. Return to near baseline physical activity: Yes    Patient is alert and oriented x4. VSS. Endures pain only with urination. Straining urine. CR 2.21, fluids overnight running at 100 mL/hr. Regular diet tolerated. Urology consulted. Ind in room.    Discharge Planner Nurse   Safe discharge environment identified: Yes  Barriers to discharge: No       Entered by: Megan Ayers 05/30/2019 1:44 AM     Please review provider order for any additional goals.   Nurse to notify provider when observation goals have been met and patient is ready for discharge.

## 2019-05-30 NOTE — PROGRESS NOTES
Urology Note    Patient seen on afternoon rounds. We discussed the findings from yesterday's surgery, namely the infected urine and placement of a stent only instead of ureteroscopy. We discussed the importance of completing his full course of antibiotics and that we will schedule him in 2-3 weeks for ureteroscopy. Okay for discharge today from a urology standpoint with Bactrim DS BID x10 days.    Damion Cueto MD  Urology PGY5  Pager [phone]

## 2019-05-30 NOTE — PLAN OF CARE
Patient's After Visit Summary was reviewed with patient and family.   Patient verbalized understanding of After Visit Summary, recommended follow up and was given an opportunity to ask questions.   Discharge medications sent home with patient/family: YES, bactrim.     Discharged with family member.    Patient discharged in stable condition with family member as ride. Picked up medications being stored in pharmacy before leaving. AVS thoroughly reviewed; instructed to call urology tomorrow to scheduled follow up. PCP follow up already scheduled for June 13th.     OBSERVATION patient END time: 1645

## 2019-05-30 NOTE — PLAN OF CARE
"PRIMARY DIAGNOSIS: s/p cysto with stent placement  OUTPATIENT/OBSERVATION GOALS TO BE MET BEFORE DISCHARGE:  1. Stable vital signs: Yes  2. Tolerating diet: Yes  3. Pain controlled with oral pain medications: Yes  4. Positive bowel sounds: Yes  5. Voiding without difficulty: Yes  6. Able to ambulate: Yes  7. Provider specific discharge goals met: Yes    Discharge Planner Nurse   Safe discharge environment identified: Yes  Barriers to discharge: No       Entered by: Jakob Wagner 05/30/2019 8:34 AM     Please review provider order for any additional goals.   Nurse to notify provider when observation goals have been met and patient is ready for discharge.    /83 (BP Location: Right arm)   Pulse 57   Temp 96.2  F (35.7  C) (Oral)   Resp 12   Ht 1.753 m (5' 9\")   Wt 88.5 kg (195 lb)   SpO2 96%   BMI 28.80 kg/m      Pt denying pain, no concerns noted at this time. Tolerating regular diet. Creatinine now 1.41 this AM. Will continue to monitor.   "

## 2019-05-30 NOTE — PLAN OF CARE
"PRIMARY DIAGNOSIS: s/p cysto with stent placement  OUTPATIENT/OBSERVATION GOALS TO BE MET BEFORE DISCHARGE:  1. Stable vital signs: Yes  2. Tolerating diet: Yes  3. Pain controlled with oral pain medications: Yes  4. Positive bowel sounds: Yes  5. Voiding without difficulty: Yes  6. Able to ambulate: Yes  7. Provider specific discharge goals met: Yes    Discharge Planner Nurse   Safe discharge environment identified: Yes  Barriers to discharge: No       Entered by: Jakob Wagner 05/30/2019    Please review provider order for any additional goals.   Nurse to notify provider when observation goals have been met and patient is ready for discharge.    /52 (BP Location: Right arm)   Pulse 67   Temp 98  F (36.7  C) (Oral)   Resp 12   Ht 1.753 m (5' 9\")   Wt 88.5 kg (195 lb)   SpO2 100%   BMI 28.80 kg/m      Pt denying pain, no concerns noted at this time. Tolerating regular diet. Awaiting urology to see patient for discharge. Will continue to monitor.   "

## 2019-05-30 NOTE — DISCHARGE SUMMARY
[facility] Observation Unit Discharge Summary          Russ Stevens MRN# [id number]   Age: 48 year old YOB: 1970     Date of Admission:  5/28/2019  Date of Discharge::  5/30/2019  Admitting Physician:  Tamir Connell MD  Discharge Physician:  Aarti Flores PA-C  Primary Physician: [facility]     Primary Discharge Diagnoses:   Acute Renal Colic  ILIANA     Hospital Course:   For detail history, please refer to H & P from 5/28/2019. In brief, this is a 48 year old male with no significant past medical history who presented as a direct admission from the Urgency Room due to ILIANA and Nephrolithiasis.     Acute Renal Colic - pt presented to the Urgency Room on 5/25 with right flank pain.  CT abd/pelvis revealed 2mm calculus in the distal right ureter with right hydronephrosis and right hydroureter, non obstructing stone in the right and left kidney.  Creatinine 1.3.  Patient returned to the UR on 5/28 with ongoing pain and rising creatinine to 1.9 and negative UA.  On 5/29, patient with rising creatinine to 2.21 and negative UA.  Urology was consulted and patient is s/p Cystourethroscopy, Right Retrograde Pyelogram and Right Ureteral Stent placement.  Turbid, purulent drainage from the ureter was noted during the procedure, therefore IV Ceftriaxone was started and return to the observation unit and monitoring overnight.  Patient's creatinine improved the day of discharge.  His urine culture was negative.  Urology recommended Bactrim DS BID x10 days and follow up in 2-3 weeks for ureteroscopy.     ILIANA - 2/2 obstructing stone.  Creatinine peaked at 2.21, down to 1.41 the day of discharge.  Patient's baseline is 1.06.  Anticipate ongoing improvement of creatinine.  Patient recommended to follow up with his pcp within one week for repeat bmp.    Procedures/Imaging:     Results for orders placed or performed during the hospital encounter of 05/28/19   XR KUB    Narrative     "XR KUB 5/29/2019 8:14 AM    HISTORY: Kidney stone.    COMPARISON: None.    FINDINGS: There are 2 densities in the left hemipelvis measuring 0.5  cm and 0.3 cm respectively. Smaller density in the contralateral right  hemipelvis measures 0.2 cm. Small density in the projection of the  right kidney measures 0.3 cm. No specific evidence of left renal  stone.      Impression    IMPRESSION: Densities in the pelvis, particularly on the left, could  be intraureteral.    PATTI MATHIS MD   XR Surgery PAOLA Fluoro L/T 5 Min w Stills    Narrative    This exam was marked as non-reportable because it will not be read by a   radiologist or a [facility] non-radiologist provider.               Consultations:   Urology    Code Status:   Full    Allergies:     Allergies   Allergen Reactions     No Known Allergies         Subjective:   Patient reports his abdomen is sore, but denies acute pain that     Physical Exam:   Blood pressure 109/52, pulse 67, temperature 98  F (36.7  C), temperature source Oral, resp. rate 12, height 1.753 m (5' 9\"), weight 88.5 kg (195 lb), SpO2 100 %. on room air.  General: Alert, interactive, NAD  Resp: clear to auscultation bilaterally, no crackles or wheezes  Cardiac: regular rate and rhythm, no murmur  Abdomen: Soft, nontender, nondistended. +BS.  No rebound or guarding.  Extremities: No LE edema  Neuro: Alert & oriented x 3, moves all extremities equally   Discharge Medicatios:        Discharge Medication List as of 5/30/2019  4:35 PM      START taking these medications    Details   sulfamethoxazole-trimethoprim (BACTRIM DS/SEPTRA DS) 800-160 MG tablet Take 1 tablet by mouth 2 times daily for 10 days, Disp-20 tablet, R-0, E-Prescribe             Instructions Given to Patient as Discharge:     Discharge Procedure Orders   Reason for your hospital stay   Order Comments: You were hospitalized due to acute kidney injury and kidney stones.     Follow-up and recommended labs and tests    Order Comments: Please " follow up with PCP within one week and Urology as instructed.     Activity   Order Comments: Your activity upon discharge: activity as tolerated     Order Specific Question Answer Comments   Is discharge order? Yes      When to contact your care team   Order Comments: Notify for fever >101.5; black or bloody stools; severe, persistent, or worsening nausea, vomiting, abdominal pain or distention, diarrhea, constipation; chest pain, difficulty breathing, swelling; dizziness, weakness, lightheadedness, fainting.  Proceed to the nearest emergency room for any urgent concerns.     Diet   Order Comments: Follow this diet upon discharge: Orders Placed This Encounter      Regular Diet Adult     Order Specific Question Answer Comments   Is discharge order? Yes        Pending Tests at Discharge:   none    Discharge Disposition:   Discharged to home     Aarti LOERAC  Hospitalist Service  Pager [phone]    >30 minutes was spent in discharge planning, care coordination, physical examination and medication reconciliation on the date of discharge, 5/30/2019

## 2019-06-03 ENCOUNTER — TELEPHONE (OUTPATIENT)
Dept: FAMILY MEDICINE | Facility: CLINIC | Age: 49
End: 2019-06-03

## 2019-06-03 NOTE — TELEPHONE ENCOUNTER
ED / Discharge Outreach Protocol    Patient Contact    Attempt # 1    Was call answered?  No.  Unable to leave message.  No VM set up     Leeann Maxwell RN

## 2019-06-03 NOTE — TELEPHONE ENCOUNTER
ALLIERER on 5/30/19 for Renal Colic  Patient has an appointment with BONITA Boateng on 6/13/19 to est care and post hospital visit.  Haleigh Sylvester

## 2019-06-04 ENCOUNTER — TELEPHONE (OUTPATIENT)
Dept: UROLOGY | Facility: CLINIC | Age: 49
End: 2019-06-04

## 2019-06-04 NOTE — TELEPHONE ENCOUNTER
ED / Discharge Outreach Protocol    Patient Contact    Attempt # 2    Was call answered?  No.  Unable to leave message.    Charlette Duval RN, BSN

## 2019-06-04 NOTE — TELEPHONE ENCOUNTER
Patient is calling saying he is having some blood in his urine and was concerned. I explained that they have placed a stent and he will notice some blood in the urine until the stent is removed.   Voiced understanding.  Radha DOVER  [facility]  June 4, 2019 10:02 AM

## 2019-06-05 NOTE — TELEPHONE ENCOUNTER
ED / Discharge Outreach Protocol    Patient Contact    Attempt # 3    Was call answered?  No.  Unable to leave message.    Pt does have OV with Moe Boateng on 6/13    Leeann Maxwell RN

## 2019-06-06 NOTE — TELEPHONE ENCOUNTER
"Hospital/TCU/ED for chronic condition Discharge Protocol    \"Hi, my name is Leeann TOMACarisa Maxwell, a registered nurse, and I am calling from [facility].  I am calling to follow up and see how things are going for you after your recent emergency visit/hospital/TCU stay.\"    Tell me how you are doing now that you are home?\" I was ok until I went back to work\"  He did start to have to have more blood in urine and pain.  He will contact urology for this       Discharge Instructions    \"Let's review your discharge instructions.  What is/are the follow-up recommendations?  Pt. Response: f/u with PCP and urology     \"Has an appointment with your primary care provider been scheduled?\"   Yes. (confirm)    \"When you see the provider, I would recommend that you bring your medications with you.\"    Medications    \"Tell me what changed about your medicines when you discharged?\"    Changes to chronic meds?    0-1    \"What questions do you have about your medications?\"    None     New diagnoses of heart failure, COPD, diabetes, or MI?    No              Medication reconciliation completed? Yes  Was MTM referral placed (*Make sure to put transitions as reason for referral)?   No    Call Summary    \"What questions or concerns do you have about your recent visit and your follow-up care?\"       If bleeding worsens or pain returns f/u with urology.  Bring any paperwork he needs filled out to OV with [facility]    Pt expressed understanding and acceptance of the plan.  Pt had no further questions at this time.  Advised can call back to clinic at any time with concerns.       \"If you have questions or things don't continue to improve, we encourage you contact us through the main clinic number (give number).  Even if the clinic is not open, triage nurses are available 24/7 to help you.     We would like you to know that our clinic has extended hours (provide information).  We also have urgent care (provide details on closest location and " "hours/contact info)\"      \"Thank you for your time and take care!\"       Leeann Maxwell RN      "

## 2019-06-13 ENCOUNTER — OFFICE VISIT (OUTPATIENT)
Dept: FAMILY MEDICINE | Facility: CLINIC | Age: 49
End: 2019-06-13
Payer: COMMERCIAL

## 2019-06-13 VITALS
RESPIRATION RATE: 16 BRPM | SYSTOLIC BLOOD PRESSURE: 107 MMHG | DIASTOLIC BLOOD PRESSURE: 73 MMHG | HEART RATE: 86 BPM | TEMPERATURE: 98.7 F | BODY MASS INDEX: 30.42 KG/M2 | WEIGHT: 206 LBS

## 2019-06-13 DIAGNOSIS — N23 RENAL COLIC: Primary | ICD-10-CM

## 2019-06-13 DIAGNOSIS — N17.9 ACUTE KIDNEY INJURY (H): ICD-10-CM

## 2019-06-13 LAB
ALBUMIN UR-MCNC: >=300 MG/DL
ANION GAP SERPL CALCULATED.3IONS-SCNC: 10 MMOL/L (ref 3–14)
APPEARANCE UR: CLEAR
BILIRUB UR QL STRIP: NEGATIVE
BUN SERPL-MCNC: 22 MG/DL (ref 7–30)
CALCIUM SERPL-MCNC: 8.6 MG/DL (ref 8.5–10.1)
CHLORIDE SERPL-SCNC: 109 MMOL/L (ref 94–109)
CO2 SERPL-SCNC: 23 MMOL/L (ref 20–32)
COLOR UR AUTO: ABNORMAL
CREAT SERPL-MCNC: 1.31 MG/DL (ref 0.66–1.25)
GFR SERPL CREATININE-BSD FRML MDRD: 64 ML/MIN/{1.73_M2}
GLUCOSE SERPL-MCNC: 85 MG/DL (ref 70–99)
GLUCOSE UR STRIP-MCNC: NEGATIVE MG/DL
HGB UR QL STRIP: ABNORMAL
KETONES UR STRIP-MCNC: NEGATIVE MG/DL
LEUKOCYTE ESTERASE UR QL STRIP: ABNORMAL
NITRATE UR QL: NEGATIVE
PH UR STRIP: 7 PH (ref 5–7)
POTASSIUM SERPL-SCNC: 4.1 MMOL/L (ref 3.4–5.3)
RBC #/AREA URNS AUTO: >100 /HPF
SODIUM SERPL-SCNC: 142 MMOL/L (ref 133–144)
SOURCE: ABNORMAL
SP GR UR STRIP: 1.02 (ref 1–1.03)
UROBILINOGEN UR STRIP-ACNC: 0.2 EU/DL (ref 0.2–1)
WBC #/AREA URNS AUTO: ABNORMAL /HPF

## 2019-06-13 PROCEDURE — 87086 URINE CULTURE/COLONY COUNT: CPT | Performed by: PHYSICIAN ASSISTANT

## 2019-06-13 PROCEDURE — 99214 OFFICE O/P EST MOD 30 MIN: CPT | Performed by: PHYSICIAN ASSISTANT

## 2019-06-13 PROCEDURE — 36415 COLL VENOUS BLD VENIPUNCTURE: CPT | Performed by: PHYSICIAN ASSISTANT

## 2019-06-13 PROCEDURE — 81001 URINALYSIS AUTO W/SCOPE: CPT | Performed by: PHYSICIAN ASSISTANT

## 2019-06-13 PROCEDURE — 80048 BASIC METABOLIC PNL TOTAL CA: CPT | Performed by: PHYSICIAN ASSISTANT

## 2019-06-13 RX ORDER — SULFAMETHOXAZOLE/TRIMETHOPRIM 800-160 MG
1 TABLET ORAL 2 TIMES DAILY
Qty: 20 TABLET | Refills: 0 | COMMUNITY
Start: 2019-06-13 | End: 2023-10-17

## 2019-06-13 NOTE — PATIENT INSTRUCTIONS
Patient Education     Preventing Kidney Stones  If you ve had a kidney stone, you may worry that you ll have another. Removing or passing your stone doesn t prevent future stones. But with your healthcare provider s help, you can reduce your risk of forming new stones. Follow up with your healthcare provider to help find new stones. You may need follow-up every 3 months to a year for a lifetime.    Drink lots of water  Staying well-hydrated is the best way to reduce your risk of future stones. Drink 8 12-ounce glasses of water daily. Have 2 with each meal and 2 between meals. Try keeping a pitcher of water nearby during the day and at night.  Take medicines if needed  Medicines, including vitamins and minerals, may be prescribed for certain types of stones. You may want to write your doses and medicine times on a calendar. Some medicines decrease stone-forming chemicals in your blood. Others help prevent those chemicals from crystallizing in urine. Still others help keep a normal acid balance in your urine.  Follow your prescribed diet  Your healthcare provider will tell you which foods contain the chemicals you should avoid. Your healthcare provider may also suggest talking to a dietitian. He or she can help you plan meals you ll enjoy. These meals won t put you at risk for future stones. You may be told to limit certain foods, depending on which type of stones you ve had. You should limit the amount of salt in your food to about 2 grams a day. This will help prevent most types of kidney stones. Make sure you get an adequate amount of calcium in your diet.  For calcium oxalate stones: Limit animal protein, such as meat, eggs, and fish. Limit grapefruit juice and alcohol. Limit high-oxalate foods (such as cola, tea, chocolate, spinach, rhubarb, wheat bran, and peanuts).  For uric acid stones: Limit high-purine foods, such as mushrooms, peas, beans, anchovies, meat, poultry, shellfish, and organ meats. These foods  increase uric acid production.  For cystine stones: Limit high-methionine foods (fish is the most common, but eggs and meats, also). These foods increase production of cystine.  Date Last Reviewed: 2/1/2017 2000-2018 [employer]. [address] [place] [address]. All rights reserved. This information is not intended as a substitute for professional medical care. Always follow your healthcare professional's instructions.

## 2019-06-13 NOTE — PROGRESS NOTES
Subjective     Russ Stevnes is a 48 year old male who presents to clinic today for the following health issues:          Hospital Follow-up Visit:    Hospital/Nursing Home/IP Rehab Facility: [facility]  Date of Admission: 5/28/19  Date of Discharge: 5/30/19  Reason(s) for Admission: kidney stone            Problems taking medications regularly:  None       Medication changes since discharge: None       Problems adhering to non-medication therapy:  None    Summary of hospitalization:  [facility] discharge summary reviewed  Diagnostic Tests/Treatments reviewed.  Follow up needed: none  Other Healthcare Providers Involved in Patient s Care:         Specialist appointment - urology. stent removal on 6/19  Update since discharge: improved. States overall pain has improved. However, continues to have blood in his urine. Called urology and he reports they are not concerned. Did have 8/10 right flank pain yesterday. Took a tylenol and one old hydrocodone and pain resolved. No current pain. No fever or chills. No changes in urination other than blood. Of note, patient states continues bactrim. Has 3 doses remaining of this.     Post Discharge Medication Reconciliation: discharge medications reconciled, continue medications without change.  Plan of care communicated with patient     Coding guidelines for this visit:  Type of Medical   Decision Making Face-to-Face Visit       within 7 Days of discharge Face-to-Face Visit        within 14 days of discharge   Moderate Complexity [id number]   High Complexity 10043 [id number]              Patient Active Problem List   Diagnosis     NO ACTIVE PROBLEMS     Chronic constipation     CARDIOVASCULAR SCREENING; LDL GOAL LESS THAN 160     Renal colic     Acute kidney injury (H)     Past Surgical History:   Procedure Laterality Date     LASER HOLMIUM LITHOTRIPSY URETER(S), INSERT STENT, COMBINED Right 5/29/2019    Procedure: 1. Cystourethroscopy 2. Right retrograde  pyelogram with interpretation of intraoperative fluoroscopic imaging 3. Right ureteral stent placement;  Surgeon: Grey Gaspar MD;  Location: [facility] OR     [facility]     SURGICAL HISTORY OF -       rt knee surgery       Social History     Tobacco Use     Smoking status: Never Smoker     Smokeless tobacco: Never Used   Substance Use Topics     Alcohol use: Yes     Comment: rare     Family History   Problem Relation Age of Onset     Family History Negative Mother      Family History Negative Father          in MVA     Heart Disease Maternal Grandfather      Family History Negative Brother         4         Current Outpatient Medications   Medication Sig Dispense Refill     Acetaminophen (TYLENOL PO) Take by mouth as needed       sulfamethoxazole-trimethoprim (BACTRIM DS/SEPTRA DS) 800-160 MG tablet Take 1 tablet by mouth 2 times daily 20 tablet 0     Allergies   Allergen Reactions     No Known Allergies      Recent Labs   Lab Test 19  0625 19  0642   CR 1.41* 2.21*   GFRESTIMATED 58* 34*   GFRESTBLACK 68 39*   POTASSIUM 4.0 4.2        Reviewed and updated as needed this visit by Provider         Review of Systems   ROS COMP: Constitutional, HEENT, cardiovascular, pulmonary, gi and gu systems are negative, except as otherwise noted.      Objective    /73 (BP Location: Right arm, Patient Position: Chair, Cuff Size: Adult Large)   Pulse 86   Temp 98.7  F (37.1  C) (Oral)   Resp 16   Wt 93.4 kg (206 lb)   BMI 30.42 kg/m    Body mass index is 30.42 kg/m .  Physical Exam   GENERAL: healthy, alert and no distress  RESP: lungs clear to auscultation - no rales, rhonchi or wheezes  CV: regular rates and rhythm, normal S1 S2, no S3 or S4 and no murmur, click or rub  ABDOMEN: mild tenderness RUQ without rebound or guarding and bowel sounds normal  BACK: no CVA tenderness, no paralumbar tenderness  PSYCH: mentation appears normal, affect  normal/bright    Diagnostic Test Results:  none         Assessment & Plan     (N23) Renal colic  (primary encounter diagnosis)  Comment: s/p stent placement. Followed by urology. Removal scheduled. Intermittent pain and ongoing hematuria. Likely normal. However, given yesterday worsening pain, though likely no concern will repeat urine and culture prior to procedure.   Plan: sulfamethoxazole-trimethoprim (BACTRIM         DS/SEPTRA DS) 800-160 MG tablet, UA reflex to         Microscopic, Urine Culture Aerobic Bacterial            (N17.9) Acute kidney injury (H)  Comment: improved with stone removal. Will repeat today.   Plan: Basic metabolic panel, UA reflex to         Microscopic, Urine Culture Aerobic Bacterial        =             Follow up: per urology. Has work wellness exam.s however, recommending annual fasting physical in 6 months.     Return in about 6 months (around 12/13/2019) for Physical Exam, Lab Work, Routine Visit.    Foster Boateng PA-C  [facility]

## 2019-06-14 LAB
BACTERIA SPEC CULT: NORMAL
SPECIMEN SOURCE: NORMAL

## 2019-06-14 SDOH — HEALTH STABILITY: MENTAL HEALTH: HOW OFTEN DO YOU HAVE A DRINK CONTAINING ALCOHOL?: NEVER

## 2019-06-19 ENCOUNTER — ANESTHESIA (OUTPATIENT)
Dept: SURGERY | Facility: CLINIC | Age: 49
End: 2019-06-19
Payer: COMMERCIAL

## 2019-06-19 ENCOUNTER — HOSPITAL ENCOUNTER (OUTPATIENT)
Facility: CLINIC | Age: 49
Discharge: HOME | End: 2019-06-19
Attending: UROLOGY | Admitting: UROLOGY
Payer: COMMERCIAL

## 2019-06-19 ENCOUNTER — APPOINTMENT (OUTPATIENT)
Dept: GENERAL RADIOLOGY | Facility: CLINIC | Age: 49
End: 2019-06-19
Attending: UROLOGY
Payer: COMMERCIAL

## 2019-06-19 ENCOUNTER — ANESTHESIA EVENT (OUTPATIENT)
Dept: SURGERY | Facility: CLINIC | Age: 49
End: 2019-06-19
Payer: COMMERCIAL

## 2019-06-19 VITALS
DIASTOLIC BLOOD PRESSURE: 87 MMHG | SYSTOLIC BLOOD PRESSURE: 126 MMHG | HEIGHT: 69 IN | WEIGHT: 209 LBS | HEART RATE: 93 BPM | OXYGEN SATURATION: 97 % | RESPIRATION RATE: 16 BRPM | TEMPERATURE: 97 F | BODY MASS INDEX: 30.96 KG/M2

## 2019-06-19 DIAGNOSIS — N23 RENAL COLIC: Primary | ICD-10-CM

## 2019-06-19 LAB — COPATH REPORT: NORMAL

## 2019-06-19 PROCEDURE — 37000009 ZZH ANESTHESIA TECHNICAL FEE, EACH ADDTL 15 MIN: Performed by: UROLOGY

## 2019-06-19 PROCEDURE — 25000128 H RX IP 250 OP 636: Performed by: NURSE ANESTHETIST, CERTIFIED REGISTERED

## 2019-06-19 PROCEDURE — 40000306 ZZH STATISTIC PRE PROC ASSESS II: Performed by: UROLOGY

## 2019-06-19 PROCEDURE — 36000052 ZZH SURGERY LEVEL 2 EA 15 ADDTL MIN: Performed by: UROLOGY

## 2019-06-19 PROCEDURE — 88300 SURGICAL PATH GROSS: CPT | Performed by: UROLOGY

## 2019-06-19 PROCEDURE — 25800025 ZZH RX 258: Performed by: UROLOGY

## 2019-06-19 PROCEDURE — 27210794 ZZH OR GENERAL SUPPLY STERILE: Performed by: UROLOGY

## 2019-06-19 PROCEDURE — 82365 CALCULUS SPECTROSCOPY: CPT | Performed by: UROLOGY

## 2019-06-19 PROCEDURE — 25500064 ZZH RX 255 OP 636: Performed by: UROLOGY

## 2019-06-19 PROCEDURE — 25800030 ZZH RX IP 258 OP 636: Performed by: ANESTHESIOLOGY

## 2019-06-19 PROCEDURE — 25000128 H RX IP 250 OP 636: Performed by: ANESTHESIOLOGY

## 2019-06-19 PROCEDURE — 71000012 ZZH RECOVERY PHASE 1 LEVEL 1 FIRST HR: Performed by: UROLOGY

## 2019-06-19 PROCEDURE — 40000277 XR SURGERY CARM FLUORO LESS THAN 5 MIN W STILLS: Mod: TC

## 2019-06-19 PROCEDURE — 25000128 H RX IP 250 OP 636: Performed by: UROLOGY

## 2019-06-19 PROCEDURE — 88300 SURGICAL PATH GROSS: CPT | Mod: 26 | Performed by: UROLOGY

## 2019-06-19 PROCEDURE — 71000027 ZZH RECOVERY PHASE 2 EACH 15 MINS: Performed by: UROLOGY

## 2019-06-19 PROCEDURE — 74420 UROGRAPHY RTRGR +-KUB: CPT | Mod: 26 | Performed by: UROLOGY

## 2019-06-19 PROCEDURE — 52352 CYSTOURETERO W/STONE REMOVE: CPT | Mod: RT | Performed by: UROLOGY

## 2019-06-19 PROCEDURE — 37000008 ZZH ANESTHESIA TECHNICAL FEE, 1ST 30 MIN: Performed by: UROLOGY

## 2019-06-19 PROCEDURE — 36000054 ZZH SURGERY LEVEL 2 W FLUORO 1ST 30 MIN: Performed by: UROLOGY

## 2019-06-19 PROCEDURE — C1769 GUIDE WIRE: HCPCS | Performed by: UROLOGY

## 2019-06-19 PROCEDURE — 25000125 ZZHC RX 250: Performed by: UROLOGY

## 2019-06-19 PROCEDURE — 71000013 ZZH RECOVERY PHASE 1 LEVEL 1 EA ADDTL HR: Performed by: UROLOGY

## 2019-06-19 PROCEDURE — 25000132 ZZH RX MED GY IP 250 OP 250 PS 637: Performed by: ANESTHESIOLOGY

## 2019-06-19 PROCEDURE — 25000125 ZZHC RX 250: Performed by: NURSE ANESTHETIST, CERTIFIED REGISTERED

## 2019-06-19 RX ORDER — CEFAZOLIN SODIUM 2 G/100ML
2 INJECTION, SOLUTION INTRAVENOUS
Status: COMPLETED | OUTPATIENT
Start: 2019-06-19 | End: 2019-06-19

## 2019-06-19 RX ORDER — ONDANSETRON 2 MG/ML
4 INJECTION INTRAMUSCULAR; INTRAVENOUS EVERY 30 MIN PRN
Status: DISCONTINUED | OUTPATIENT
Start: 2019-06-19 | End: 2019-06-19 | Stop reason: HOSPADM

## 2019-06-19 RX ORDER — ONDANSETRON 4 MG/1
4 TABLET, ORALLY DISINTEGRATING ORAL EVERY 30 MIN PRN
Status: DISCONTINUED | OUTPATIENT
Start: 2019-06-19 | End: 2019-06-19 | Stop reason: HOSPADM

## 2019-06-19 RX ORDER — NALOXONE HYDROCHLORIDE 0.4 MG/ML
.1-.4 INJECTION, SOLUTION INTRAMUSCULAR; INTRAVENOUS; SUBCUTANEOUS
Status: DISCONTINUED | OUTPATIENT
Start: 2019-06-19 | End: 2019-06-19 | Stop reason: HOSPADM

## 2019-06-19 RX ORDER — LIDOCAINE HYDROCHLORIDE 10 MG/ML
INJECTION, SOLUTION INFILTRATION; PERINEURAL PRN
Status: DISCONTINUED | OUTPATIENT
Start: 2019-06-19 | End: 2019-06-19

## 2019-06-19 RX ORDER — OXYCODONE AND ACETAMINOPHEN 5; 325 MG/1; MG/1
1 TABLET ORAL EVERY 6 HOURS PRN
Qty: 5 TABLET | Refills: 0 | Status: SHIPPED | OUTPATIENT
Start: 2019-06-19 | End: 2019-06-20

## 2019-06-19 RX ORDER — GLYCOPYRROLATE 0.2 MG/ML
INJECTION, SOLUTION INTRAMUSCULAR; INTRAVENOUS PRN
Status: DISCONTINUED | OUTPATIENT
Start: 2019-06-19 | End: 2019-06-19

## 2019-06-19 RX ORDER — MEPERIDINE HYDROCHLORIDE 50 MG/ML
12.5 INJECTION INTRAMUSCULAR; INTRAVENOUS; SUBCUTANEOUS
Status: DISCONTINUED | OUTPATIENT
Start: 2019-06-19 | End: 2019-06-19 | Stop reason: HOSPADM

## 2019-06-19 RX ORDER — LIDOCAINE 40 MG/G
CREAM TOPICAL
Status: DISCONTINUED | OUTPATIENT
Start: 2019-06-19 | End: 2019-06-19 | Stop reason: HOSPADM

## 2019-06-19 RX ORDER — CEFAZOLIN SODIUM 1 G/3ML
1 INJECTION, POWDER, FOR SOLUTION INTRAMUSCULAR; INTRAVENOUS SEE ADMIN INSTRUCTIONS
Status: DISCONTINUED | OUTPATIENT
Start: 2019-06-19 | End: 2019-06-19 | Stop reason: HOSPADM

## 2019-06-19 RX ORDER — FENTANYL CITRATE 50 UG/ML
25-50 INJECTION, SOLUTION INTRAMUSCULAR; INTRAVENOUS
Status: DISCONTINUED | OUTPATIENT
Start: 2019-06-19 | End: 2019-06-19 | Stop reason: HOSPADM

## 2019-06-19 RX ORDER — PROPOFOL 10 MG/ML
INJECTION, EMULSION INTRAVENOUS PRN
Status: DISCONTINUED | OUTPATIENT
Start: 2019-06-19 | End: 2019-06-19

## 2019-06-19 RX ORDER — SODIUM CHLORIDE, SODIUM LACTATE, POTASSIUM CHLORIDE, CALCIUM CHLORIDE 600; 310; 30; 20 MG/100ML; MG/100ML; MG/100ML; MG/100ML
INJECTION, SOLUTION INTRAVENOUS CONTINUOUS
Status: DISCONTINUED | OUTPATIENT
Start: 2019-06-19 | End: 2019-06-19 | Stop reason: HOSPADM

## 2019-06-19 RX ORDER — ONDANSETRON 2 MG/ML
INJECTION INTRAMUSCULAR; INTRAVENOUS PRN
Status: DISCONTINUED | OUTPATIENT
Start: 2019-06-19 | End: 2019-06-19

## 2019-06-19 RX ORDER — DEXAMETHASONE SODIUM PHOSPHATE 4 MG/ML
INJECTION, SOLUTION INTRA-ARTICULAR; INTRALESIONAL; INTRAMUSCULAR; INTRAVENOUS; SOFT TISSUE PRN
Status: DISCONTINUED | OUTPATIENT
Start: 2019-06-19 | End: 2019-06-19

## 2019-06-19 RX ORDER — KETOROLAC TROMETHAMINE 30 MG/ML
INJECTION, SOLUTION INTRAMUSCULAR; INTRAVENOUS PRN
Status: DISCONTINUED | OUTPATIENT
Start: 2019-06-19 | End: 2019-06-19

## 2019-06-19 RX ORDER — FENTANYL CITRATE 50 UG/ML
INJECTION, SOLUTION INTRAMUSCULAR; INTRAVENOUS PRN
Status: DISCONTINUED | OUTPATIENT
Start: 2019-06-19 | End: 2019-06-19

## 2019-06-19 RX ORDER — ACETAMINOPHEN 325 MG/1
975 TABLET ORAL ONCE
Status: COMPLETED | OUTPATIENT
Start: 2019-06-19 | End: 2019-06-19

## 2019-06-19 RX ADMIN — FENTANYL CITRATE 50 MCG: 50 INJECTION INTRAMUSCULAR; INTRAVENOUS at 13:36

## 2019-06-19 RX ADMIN — HYDROMORPHONE HYDROCHLORIDE 0.5 MG: 1 INJECTION, SOLUTION INTRAMUSCULAR; INTRAVENOUS; SUBCUTANEOUS at 14:17

## 2019-06-19 RX ADMIN — FENTANYL CITRATE 50 MCG: 50 INJECTION INTRAMUSCULAR; INTRAVENOUS at 13:48

## 2019-06-19 RX ADMIN — MIDAZOLAM 2 MG: 1 INJECTION INTRAMUSCULAR; INTRAVENOUS at 12:17

## 2019-06-19 RX ADMIN — DEXAMETHASONE SODIUM PHOSPHATE 4 MG: 4 INJECTION, SOLUTION INTRA-ARTICULAR; INTRALESIONAL; INTRAMUSCULAR; INTRAVENOUS; SOFT TISSUE at 12:22

## 2019-06-19 RX ADMIN — HYDROMORPHONE HYDROCHLORIDE 0.5 MG: 1 INJECTION, SOLUTION INTRAMUSCULAR; INTRAVENOUS; SUBCUTANEOUS at 14:05

## 2019-06-19 RX ADMIN — PROPOFOL 200 MG: 10 INJECTION, EMULSION INTRAVENOUS at 12:22

## 2019-06-19 RX ADMIN — FENTANYL CITRATE 100 MCG: 50 INJECTION, SOLUTION INTRAMUSCULAR; INTRAVENOUS at 12:23

## 2019-06-19 RX ADMIN — ACETAMINOPHEN 975 MG: 325 TABLET, FILM COATED ORAL at 14:48

## 2019-06-19 RX ADMIN — SODIUM CHLORIDE, POTASSIUM CHLORIDE, SODIUM LACTATE AND CALCIUM CHLORIDE: 600; 310; 30; 20 INJECTION, SOLUTION INTRAVENOUS at 14:43

## 2019-06-19 RX ADMIN — ONDANSETRON 4 MG: 2 INJECTION INTRAMUSCULAR; INTRAVENOUS at 12:36

## 2019-06-19 RX ADMIN — KETOROLAC TROMETHAMINE 30 MG: 30 INJECTION, SOLUTION INTRAMUSCULAR at 12:55

## 2019-06-19 RX ADMIN — LIDOCAINE HYDROCHLORIDE 30 MG: 10 INJECTION, SOLUTION INFILTRATION; PERINEURAL at 12:22

## 2019-06-19 RX ADMIN — GLYCOPYRROLATE 0.2 MG: 0.2 INJECTION, SOLUTION INTRAMUSCULAR; INTRAVENOUS at 12:29

## 2019-06-19 RX ADMIN — CEFAZOLIN SODIUM 2 G: 2 INJECTION, SOLUTION INTRAVENOUS at 12:20

## 2019-06-19 RX ADMIN — SODIUM CHLORIDE, POTASSIUM CHLORIDE, SODIUM LACTATE AND CALCIUM CHLORIDE: 600; 310; 30; 20 INJECTION, SOLUTION INTRAVENOUS at 11:39

## 2019-06-19 ASSESSMENT — MIFFLIN-ST. JEOR: SCORE: 1808.65

## 2019-06-19 NOTE — OP NOTE
Procedure Date: 06/19/2019      SURGEON:  Grey Gaspar MD      PREOPERATIVE DIAGNOSIS:  Right kidney stone.      POSTOPERATIVE DIAGNOSIS:  Right kidney stone.      PROCEDURES PERFORMED:  Cystoscopy, right ureteral stent removal, right retrograde pyelogram, interpretation of fluoroscopic images, right ureteroscopy with stone basketing.      ANESTHESIA:  General.      COMPLICATIONS:  None.      INDICATIONS FOR PROCEDURE:  Russ Stevens is a 48-year-old gentleman who previously presented with an obstructing right ureteral stone and a stone in the right kidney.  Upon evaluation in the operating room last time, he was found to have cloudy and infected appearing urine, so a stent was placed.  He now presents for stent removal and stone removal.      DETAILS OF THE PROCEDURE:  The risks and benefits of the procedure were explained in detail to the patient and informed consent was obtained.  The patient was brought to the operating room and placed supine on the table, where he underwent general endotracheal anesthetic.  He was then moved down to the dorsal lithotomy position, where he was prepped and draped in standard sterile fashion.      The procedure began by passing the 22-Chinese rigid cystoscope through the patient's bladder for cystoscopy.  There were no urothelial abnormalities identified.  I grasped the stent and pulled to the level of the urethral meatus.  I then cannulated the stent with a Glidewire under fluoroscopic guidance and removed the stent.  Alongside the Glidewire, I then passed the rigid ureteroscope through the urethra and into the bladder and up the right ureter.  The right ureter was clear of any stones.  I passed a second Glidewire into the right kidney and backed off the rigid scope.  Over this second Glidewire, I then passed the flexible ureteroscope.  I performed a retrograde pyelogram through the scope and performed a complete renoscopy under fluoroscopic guidance.  There was a single very  small stone seen in the lower pole of the right kidney.  The rest of the kidney was normal throughout.  I basketed out the stone intact.  I removed the wire and I did not replace the stent.  I drained the bladder and the procedure was concluded.      The patient tolerated the procedure without complications.  He went to the post-anesthetic care in good condition.  He will go home from there.         DAKOTA VALDIVIA MD             D: 2019   T: 2019   MT: KELSEY      Name:     ANNE-MARIE CROW   MRN:      [id number]        Account:        [id number]   :      1970           Procedure Date: 2019      Document: [id number]

## 2019-06-19 NOTE — ANESTHESIA PREPROCEDURE EVALUATION
Anesthesia Pre-Procedure Evaluation    Patient: Russ Stevens   MRN: [id number] : 1970          Preoperative Diagnosis: kidney stone    Procedure(s):  Right cystoscopy, right ureteroscopy with holmium laser, right stent removal and stone extraction    Past Medical History:   Diagnosis Date     Gastroesophageal reflux disease     many years ago     NO ACTIVE PROBLEMS      Renal disease      Past Surgical History:   Procedure Laterality Date     LASER HOLMIUM LITHOTRIPSY URETER(S), INSERT STENT, COMBINED Right 2019    Procedure: 1. Cystourethroscopy 2. Right retrograde pyelogram with interpretation of intraoperative fluoroscopic imaging 3. Right ureteral stent placement;  Surgeon: Grey Gaspar MD;  Location: [facility] OR     ORTHOPEDIC SURGERY Right     knee arthroscopy     SURGICAL HISTORY OF -       cyst removal from [place]     SURGICAL HISTORY OF -       rt knee surgery     Anesthesia Evaluation     . Pt has had prior anesthetic. Type: General    No history of anesthetic complications          ROS/MED HX    ENT/Pulmonary:  - neg pulmonary ROS     Neurologic:  - neg neurologic ROS     Cardiovascular:  - neg cardiovascular ROS       METS/Exercise Tolerance:     Hematologic:  - neg hematologic  ROS       Musculoskeletal:  - neg musculoskeletal ROS       GI/Hepatic:     (+) GERD Asymptomatic on medication,       Renal/Genitourinary:     (+) Nephrolithiasis ,       Endo:     (+) Obesity, .      Psychiatric:  - neg psychiatric ROS       Infectious Disease:  - neg infectious disease ROS       Malignancy:         Other:    - neg other ROS                      Physical Exam  Normal systems: cardiovascular, pulmonary and dental    Airway   Mallampati: II  TM distance: >3 FB  Neck ROM: full    Dental     Cardiovascular       Pulmonary             Lab Results   Component Value Date    WBC 6.5 2019    HGB 14.2 2019    HCT 43.3 2019     (L) 2019     2019    POTASSIUM  "4.1 06/13/2019    CHLORIDE 109 06/13/2019    CO2 23 06/13/2019    BUN 22 06/13/2019    CR 1.31 (H) 06/13/2019    GLC 85 06/13/2019    SMITA 8.6 06/13/2019    ALBUMIN 4.4 03/19/2010    PROTTOTAL 7.3 03/19/2010    ALT 30 03/19/2010    AST 27 03/19/2010    ALKPHOS 110 03/19/2010    BILITOTAL 0.5 03/19/2010    INR 0.91 03/19/2010    TSH 0.86 03/19/2010       Preop Vitals  BP Readings from Last 3 Encounters:   06/19/19 121/83   06/13/19 107/73   05/30/19 120/74    Pulse Readings from Last 3 Encounters:   06/13/19 86   05/30/19 73   12/30/16 78      Resp Readings from Last 3 Encounters:   06/19/19 16   06/13/19 16   05/30/19 20    SpO2 Readings from Last 3 Encounters:   06/19/19 97%   05/30/19 97%   12/30/16 98%      Temp Readings from Last 1 Encounters:   06/19/19 97.8  F (36.6  C) (Temporal)    Ht Readings from Last 1 Encounters:   06/19/19 1.753 m (5' 9.02\")      Wt Readings from Last 1 Encounters:   06/19/19 94.8 kg (209 lb)    Estimated body mass index is 30.85 kg/m  as calculated from the following:    Height as of this encounter: 1.753 m (5' 9.02\").    Weight as of this encounter: 94.8 kg (209 lb).       Anesthesia Plan      History & Physical Review  History and physical reviewed and following examination; no interval change.    ASA Status:  2 .    NPO Status:  > 8 hours    Plan for General and LMA with Intravenous induction. Maintenance will be Balanced.    PONV prophylaxis:  Ondansetron (or other 5HT-3) and Dexamethasone or Solumedrol       Postoperative Care  Postoperative pain management:  IV analgesics, Oral pain medications and Multi-modal analgesia.      Consents  Anesthetic plan, risks, benefits and alternatives discussed with:  Patient..                 Pelon Esqueda MD                    .  "

## 2019-06-19 NOTE — DISCHARGE INSTRUCTIONS
GENERAL ANESTHESIA OR SEDATION ADULT DISCHARGE INSTRUCTIONS   SPECIAL PRECAUTIONS FOR 24 HOURS AFTER SURGERY    IT IS NOT UNUSUAL TO FEEL LIGHT-HEADED OR FAINT, UP TO 24 HOURS AFTER SURGERY OR WHILE TAKING PAIN MEDICATION.  IF YOU HAVE THESE SYMPTOMS; SIT FOR A FEW MINUTES BEFORE STANDING AND HAVE SOMEONE ASSIST YOU WHEN YOU GET UP TO WALK OR USE THE BATHROOM.    YOU SHOULD REST AND RELAX FOR THE NEXT 24 HOURS AND YOU MUST MAKE ARRANGEMENTS TO HAVE SOMEONE STAY WITH YOU FOR AT LEAST 24 HOURS AFTER YOUR DISCHARGE.  AVOID HAZARDOUS AND STRENUOUS ACTIVITIES.  DO NOT MAKE IMPORTANT DECISIONS FOR 24 HOURS.    DO NOT DRIVE ANY VEHICLE OR OPERATE MECHANICAL EQUIPMENT FOR 24 HOURS FOLLOWING THE END OF YOUR SURGERY.  EVEN THOUGH YOU MAY FEEL NORMAL, YOUR REACTIONS MAY BE AFFECTED BY THE MEDICATION YOU HAVE RECEIVED.    DO NOT DRINK ALCOHOLIC BEVERAGES FOR 24 HOURS FOLLOWING YOUR SURGERY.    DRINK CLEAR LIQUIDS (APPLE JUICE, GINGER ALE, 7-UP, BROTH, ETC.).  PROGRESS TO YOUR REGULAR DIET AS YOU FEEL ABLE.    YOU MAY HAVE A DRY MOUTH, A SORE THROAT, MUSCLES ACHES OR TROUBLE SLEEPING.  THESE SHOULD GO AWAY AFTER 24 HOURS.    CALL YOUR DOCTOR FOR ANY OF THE FOLLOWING:  SIGNS OF INFECTION (FEVER, GROWING TENDERNESS AT THE SURGERY SITE, A LARGE AMOUNT OF DRAINAGE OR BLEEDING, SEVERE PAIN, FOUL-SMELLING DRAINAGE, REDNESS OR SWELLING.    IT HAS BEEN OVER 8 TO 10 HOURS SINCE SURGERY AND YOU ARE STILL NOT ABLE TO URINATE (PASS WATER).     You received Toradol, an IV form of ibuprofen (Motrin) at 1:00pm.  Do not take any ibuprofen products until 7:00pm.  Maximum acetaminophen (Tylenol) dose from all sources should not exceed 4 grams (4000 mg) per day.  You had 975mg at 2:50pm.          CYSTOSCOPY DISCHARGE INSTRUCTIONS  [facility]  [address]  [phone]    YOU MAY GO BACK TO YOUR NORMAL DIET AND ACTIVITY, UNLESS YOUR DOCTOR TELLS YOU NOT TO.    FOR THE NEXT TWO DAYS, YOU MAY NOTICE:    SOME BLOOD IN  YOUR URINE.  SOME BURNING WHEN YOU URINATE.  AN URGE TO URINATE MORE OFTEN.  BLADDER SPASMS.    THESE ARE NORMAL AFTER THE PROCEDURE.  THEY SHOULD GO AWAY AFTER A DAY OR TWO.  TO RELIEVE THESE PROBLEMS:     DRINK 6 TO 8 LARGE GLASSES OF WATER EACH DAY (INCLUDES DRINKS AT MEALS).  THIS WILL HELP CLEAR THE URINE.    TAKE WARM BATHS TO RELIEVE PAIN AND BLADDER SPASMS.  DO NOT ADD ANYTHING TO THE BATH WATER.    YOUR DOCTOR MAY PRESCRIBE PAIN MEDICINE.  YOU MAY ALSO TAKE TYLENOL (ACETAMINOPHEN) FOR PAIN.    CALL YOUR SURGEON IF YOU HAVE:    A FEVER OVER 101 DEGREES.  CHECK YOUR TEMPERATURE UNDER YOUR TONGUE.    CHILLS.    FAILURE TO URINATE (NO URINE COMES OUT WHEN YOU TRY TO USE THE TOILET).  TRY SOAKING IN A BATHTUB FULL OF WARM WATER.  IF STILL NO URINE, CALL YOUR DOCTOR.    A LOT OF BLOOD IN THE URINE, OR BLOOD CLOTS LARGER THAN A NICKEL.      PAIN IN THE BACK OR BELLY AREA (ABDOMEN).    PAIN OR SPASMS THAT ARE NOT RELIEVED BY WARM TUB BATHS AND PAIN MEDICINE.      SEVERE PAIN, BURNING OR OTHER PROBLEMS WHILE PASSING URINE.    PAIN THAT GETS WORSE AFTER TWO DAYS.

## 2019-06-19 NOTE — ANESTHESIA CARE TRANSFER NOTE
Patient: Russ Stevens    Procedure(s):  Right cystoscopy, right ureteroscopy  right stent removal and stone extraction.  Laser on standby.    Diagnosis: kidney stone  Diagnosis Additional Information: No value filed.    Anesthesia Type:   General, LMA     Note:  Airway :Face Mask  Patient transferred to:PACU  Comments: Nubia Report: Identifed the Patient, Identified the Reponsible Provider, Reviewed the pertinent medical history, Discussed the surgical course, Reviewed Intra-OP anesthesia mangement and issues during anesthesia, Set expectations for post-procedure period and Allowed opportunity for questions and acknowledgement of understanding      Vitals: (Last set prior to Anesthesia Care Transfer)    CRNA VITALS  6/19/2019 1226 - 6/19/2019 1301      6/19/2019             Pulse:  92    SpO2:  100 %    Resp Rate (observed):  22                Electronically Signed By: JASEN Hoffmann CRNA  June 19, 2019  1:01 PM

## 2019-06-20 ENCOUNTER — NURSE TRIAGE (OUTPATIENT)
Dept: CALL CENTER | Age: 49
End: 2019-06-20

## 2019-06-20 NOTE — TELEPHONE ENCOUNTER
Called Russ  He does not feel he has a fever now. Suggested he go  And buy a thermometer  So if feels feverish again he can record temp. Encouraged to drink water . Informed him he may see some blood in the urine today as he maybe passing some fragments. He will stay home from work tonight. Haleigh will fill out RT work slip and FMLA papers. He will call back with any concerns.Yuridia Garduno LPN

## 2019-06-20 NOTE — TELEPHONE ENCOUNTER
48 year old male s/p Cystoscopy, right ureteral stent removal, right retrograde pyelogram, interpretation of fluoroscopic images, right ureteroscopy with stone basketing 6/19/19.  He calls today with a few questions   He woke up last night extremely sweaty and very chilled   He then took a warm shower.  He states he bundled up and slept and he again woke up sweaty and chilled. He is unsure if he has a fever  He does not have a thermometer   He is drinking a lot of water.  He states his urine has cleared up somewhat since he got home   He see an occasional blood clot.  He did have quite a bit of burning when urinating but it has improved   He does have discomfort in his right lower back -he states it was relieved from a warm shower. He has not eaten since he was at the hospital yesterday and had some christina crackers.  He states he has no appetite.    He is scheduled to work tonight at 10pm.  He thought he should work out to see if that made him feel better    I instructed him not to work out   Unsure if he is running a fever ...Advised him to stay home and rest  Continue to drink lots of water  avoid caffeine and alcohol.  Start eating even if poor appetite  Watch for signs of infection -burning when urinating,increased pain,fever,cloudy or foul smelling urine        Russ needs a letter for work faxed to [employer] (Fax #[phone]) excusing him from work tonight.  He also would like to discuss [facility] paperwork work with Dr. Gaspar's nurse and schedule a follow up appt..      Will forward information to [place] [facility]

## 2019-06-21 LAB
APPEARANCE STONE: NORMAL
COMPN STONE: NORMAL
NUMBER STONE: 1
SIZE STONE: NORMAL MM
WT STONE: 5 MG

## 2019-06-21 NOTE — TELEPHONE ENCOUNTER
Russ called in this afternoon to let me know his temp is 95.4.  He is feeling much better today.  He has been eating.  He continues to drink a lot of water.  He slept well without chills or sweats last night.  He still has pink urine  He denies burning when urinating  Denies pain.    Informed him he is recovering well and to keep doing wh\at he is doing

## 2019-11-04 ENCOUNTER — HEALTH MAINTENANCE LETTER (OUTPATIENT)
Age: 49
End: 2019-11-04

## 2020-11-22 ENCOUNTER — HEALTH MAINTENANCE LETTER (OUTPATIENT)
Age: 50
End: 2020-11-22

## 2021-01-06 ENCOUNTER — HOSPITAL ENCOUNTER (EMERGENCY)
Facility: CLINIC | Age: 51
Discharge: HOME | End: 2021-01-06
Attending: PHYSICIAN ASSISTANT | Admitting: PHYSICIAN ASSISTANT
Payer: COMMERCIAL

## 2021-01-06 ENCOUNTER — NURSE TRIAGE (OUTPATIENT)
Dept: NURSING | Facility: CLINIC | Age: 51
End: 2021-01-06

## 2021-01-06 ENCOUNTER — APPOINTMENT (OUTPATIENT)
Dept: CT IMAGING | Facility: CLINIC | Age: 51
End: 2021-01-06
Attending: PHYSICIAN ASSISTANT
Payer: COMMERCIAL

## 2021-01-06 VITALS
RESPIRATION RATE: 18 BRPM | DIASTOLIC BLOOD PRESSURE: 75 MMHG | BODY MASS INDEX: 29.52 KG/M2 | WEIGHT: 200 LBS | TEMPERATURE: 97.7 F | OXYGEN SATURATION: 100 % | SYSTOLIC BLOOD PRESSURE: 127 MMHG | HEART RATE: 66 BPM

## 2021-01-06 DIAGNOSIS — N20.1 URETERAL CALCULI: ICD-10-CM

## 2021-01-06 LAB
ALBUMIN UR-MCNC: 10 MG/DL
ANION GAP SERPL CALCULATED.3IONS-SCNC: 2 MMOL/L (ref 3–14)
APPEARANCE UR: CLEAR
BILIRUB UR QL STRIP: NEGATIVE
BUN SERPL-MCNC: 16 MG/DL (ref 7–30)
CALCIUM SERPL-MCNC: 8.6 MG/DL (ref 8.5–10.1)
CHLORIDE SERPL-SCNC: 107 MMOL/L (ref 94–109)
CO2 SERPL-SCNC: 27 MMOL/L (ref 20–32)
COLOR UR AUTO: ABNORMAL
CREAT SERPL-MCNC: 1.56 MG/DL (ref 0.66–1.25)
GFR SERPL CREATININE-BSD FRML MDRD: 51 ML/MIN/{1.73_M2}
GLUCOSE SERPL-MCNC: 111 MG/DL (ref 70–99)
GLUCOSE UR STRIP-MCNC: NEGATIVE MG/DL
HGB UR QL STRIP: NEGATIVE
KETONES UR STRIP-MCNC: NEGATIVE MG/DL
LEUKOCYTE ESTERASE UR QL STRIP: NEGATIVE
MUCOUS THREADS #/AREA URNS LPF: PRESENT /LPF
NITRATE UR QL: NEGATIVE
PH UR STRIP: 5.5 PH (ref 5–7)
POTASSIUM SERPL-SCNC: 4.3 MMOL/L (ref 3.4–5.3)
RBC #/AREA URNS AUTO: 1 /HPF (ref 0–2)
SODIUM SERPL-SCNC: 136 MMOL/L (ref 133–144)
SOURCE: ABNORMAL
SP GR UR STRIP: 1.03 (ref 1–1.03)
UROBILINOGEN UR STRIP-MCNC: NORMAL MG/DL (ref 0–2)
WBC #/AREA URNS AUTO: 3 /HPF (ref 0–5)

## 2021-01-06 PROCEDURE — 96375 TX/PRO/DX INJ NEW DRUG ADDON: CPT

## 2021-01-06 PROCEDURE — 99284 EMERGENCY DEPT VISIT MOD MDM: CPT | Mod: 25

## 2021-01-06 PROCEDURE — 250N000013 HC RX MED GY IP 250 OP 250 PS 637: Performed by: PHYSICIAN ASSISTANT

## 2021-01-06 PROCEDURE — 80048 BASIC METABOLIC PNL TOTAL CA: CPT | Performed by: PHYSICIAN ASSISTANT

## 2021-01-06 PROCEDURE — 96361 HYDRATE IV INFUSION ADD-ON: CPT

## 2021-01-06 PROCEDURE — 74176 CT ABD & PELVIS W/O CONTRAST: CPT

## 2021-01-06 PROCEDURE — 96374 THER/PROPH/DIAG INJ IV PUSH: CPT

## 2021-01-06 PROCEDURE — 250N000011 HC RX IP 250 OP 636: Performed by: PHYSICIAN ASSISTANT

## 2021-01-06 PROCEDURE — 258N000003 HC RX IP 258 OP 636: Performed by: PHYSICIAN ASSISTANT

## 2021-01-06 PROCEDURE — 81001 URINALYSIS AUTO W/SCOPE: CPT | Performed by: PHYSICIAN ASSISTANT

## 2021-01-06 RX ORDER — OXYCODONE HYDROCHLORIDE 5 MG/1
5 TABLET ORAL ONCE
Status: COMPLETED | OUTPATIENT
Start: 2021-01-06 | End: 2021-01-06

## 2021-01-06 RX ORDER — ONDANSETRON 4 MG/1
4 TABLET, ORALLY DISINTEGRATING ORAL EVERY 8 HOURS PRN
Qty: 10 TABLET | Refills: 0 | Status: SHIPPED | OUTPATIENT
Start: 2021-01-06 | End: 2021-01-09

## 2021-01-06 RX ORDER — ONDANSETRON 2 MG/ML
4 INJECTION INTRAMUSCULAR; INTRAVENOUS EVERY 30 MIN PRN
Status: DISCONTINUED | OUTPATIENT
Start: 2021-01-06 | End: 2021-01-07 | Stop reason: HOSPADM

## 2021-01-06 RX ORDER — OXYCODONE HYDROCHLORIDE 5 MG/1
5 TABLET ORAL EVERY 6 HOURS PRN
Qty: 16 TABLET | Refills: 0 | Status: SHIPPED | OUTPATIENT
Start: 2021-01-06 | End: 2023-10-17

## 2021-01-06 RX ORDER — SODIUM CHLORIDE 9 MG/ML
INJECTION, SOLUTION INTRAVENOUS CONTINUOUS
Status: DISCONTINUED | OUTPATIENT
Start: 2021-01-06 | End: 2021-01-07 | Stop reason: HOSPADM

## 2021-01-06 RX ORDER — IBUPROFEN 400 MG/1
400 TABLET, FILM COATED ORAL EVERY 6 HOURS PRN
Qty: 30 TABLET | Refills: 0 | Status: SHIPPED | OUTPATIENT
Start: 2021-01-06 | End: 2023-10-17

## 2021-01-06 RX ORDER — KETOROLAC TROMETHAMINE 15 MG/ML
15 INJECTION, SOLUTION INTRAMUSCULAR; INTRAVENOUS ONCE
Status: COMPLETED | OUTPATIENT
Start: 2021-01-06 | End: 2021-01-06

## 2021-01-06 RX ADMIN — ONDANSETRON 4 MG: 2 INJECTION INTRAMUSCULAR; INTRAVENOUS at 22:40

## 2021-01-06 RX ADMIN — KETOROLAC TROMETHAMINE 15 MG: 15 INJECTION, SOLUTION INTRAMUSCULAR; INTRAVENOUS at 22:40

## 2021-01-06 RX ADMIN — OXYCODONE HYDROCHLORIDE 5 MG: 5 TABLET ORAL at 21:37

## 2021-01-06 RX ADMIN — SODIUM CHLORIDE 1000 ML: 9 INJECTION, SOLUTION INTRAVENOUS at 22:40

## 2021-01-06 ASSESSMENT — ENCOUNTER SYMPTOMS
CHILLS: 0
DYSURIA: 0
FLANK PAIN: 1
FEVER: 0
HEMATURIA: 0
ABDOMINAL PAIN: 0
NAUSEA: 0
VOMITING: 0

## 2021-01-06 NOTE — LETTER
January 6, 2021      To Whom It May Concern:      Russ Stevens was seen in our Emergency Department today, 01/06/21.  I expect his condition to improve over the next 2-3 days.  He may return to work/school when improved.    Sincerely,        Anya CRAWLEY

## 2021-01-07 NOTE — ED PROVIDER NOTES
History   Chief Complaint:  Flank Pain     The history is provided by the patient.      Russ Stevens is a 50 year old male with history of kidney stones who presents for evaluation of flank pain. The patient states that this morning around 0500 he was woken up by new and sudden right flank pain that radiates slightly into his back. He states that his pain has been constant since its development and he has only experienced some mild relief from a warm bath. He took Tylenol around 1700 and this did not provide any significant relief. He reports at this time that his pain is a 8/10 in pain level. He notes that this pain is not as intense as his last kidney stone. He denies any dysuria, hematuria, fever, chills, abdominal pain, nausea, vomiting, and any other known symptoms or concerns at this time.     Review of Systems   Constitutional: Negative for chills and fever.   Gastrointestinal: Negative for abdominal pain, nausea and vomiting.   Genitourinary: Positive for flank pain (radiates into back). Negative for dysuria and hematuria.   All other systems reviewed and are negative.    Allergies:  No Known Allergies    Medications:  The patient is currently on no regular medications.    Past Medical History:    GERD  Renal Disease  Kidney Stones  Acute Kidney Injury  Chronic constipation      Past Surgical History:    Cystoscopy, Retrogrades, Extract Stone, Inert Stent Combined  Laser Holmium Lithotripsy ureter(s), insert stent combined  Knee arthroscopy  RT Knee Surgery  Cyst removal from forehead     Social History:  The patient presents to the ED alone.   Tobacco Use: Never     Physical Exam     Patient Vitals for the past 24 hrs:   BP Temp Temp src Pulse Resp SpO2 Weight   01/06/21 2300 127/75 -- -- 66 -- 100 % --   01/06/21 2245 (!) 143/73 -- -- 63 -- 100 % --   01/06/21 2102 139/77 -- -- -- -- -- --   01/06/21 2101 -- 97.7  F (36.5  C) Temporal 76 18 99 % 90.7 kg (200 lb)       Physical Exam  .Vitals signs and  nursing note reviewed.   HENT:      Nose: Nose normal. No congestion or rhinorrhea.     Mouth/Throat:      Mouth: Mucous membranes are moist.   Eyes:      General: No scleral icterus.     Extraocular Movements: Extraocular movements intact.      Conjunctiva/sclera: Conjunctivae normal.   Cardiovascular:      Rate and Rhythm: Regular rhythm. Normal Rate.     Pulses: Normal pulses.      Heart sounds: Normal heart sounds.   Pulmonary:      Effort: Pulmonary effort is normal.      Breath sounds: Normal breath sounds.   Abdominal:      General: Abdomen is flat. Bowel sounds are normal.      Palpations: Abdomen is soft.      Tenderness: Mild right flank tenderness. CVA tenderness on right side.  Musculoskeletal: Normal range of motion.      Right lower leg: No edema.      Left lower leg: No edema.   Skin:     General: Skin is warm and dry.   Neurological:      Mental Status: Responds appropriately to questions.  Psychiatric:         Mood and Affect: Mood normal.         Behavior: Behavior normal.     Emergency Department Course     Imaging:  CT Abdomen Pelvis w/o Contrast:  1.  Moderate right hydronephrosis secondary to a 9 x 6 mm proximal right ureteral stone.  Report per radiology     Laboratory:  BMP: Anion gap 2 (L), Glucose 111 (H), Creatine 1.56 (H), GFR 59 (L), o/w WNL    UAM: Protein Albumin 10, Mucous Present    Emergency Department Course:    Reviewed:  I reviewed nursing notes, vitals, past medical history and care everywhere    Assessments:  2110 I performed a physical exam of the patient. Findings as above.      The patient was sent for a CT Abdomen/Pelvis while in the emergency department, results above.      IV was inserted and blood was drawn for laboratory testing, results above.     The patient provided a urine sample here in the emergency department. This was sent for laboratory testing, findings above.    2250 Patient rechecked and updated by Deon Pino PA-C. Patient states his pain is improving  and that he is open to going home for out-patient management.    2317 Patient rechecked and updated. We discussed plan of care and questions were answered. Patient states he would like to go home tonight due to some work he has.     Interventions:  2137 Roxicodone 5 mg Oral   2240 Toradol 15 mg IV  2240 NaCl 0.9% 1000 mL IV  2240 Zofran 4 mg IV    Disposition:  The patient was discharged to home.     Impression & Plan     Medical Decision Making:  Russ Stevens is a 50 year old year old patient who presented with unilateral flank and abdominal pain consistent with renal colic. Vitals were reviewed-patient afebrile. On physical exam, the patient had right flank pain with right sided CVA tenderness. CT confirms a ureteral stone measuring 9 6 mm. Pain is controlled with interventions in the Emergency Department. There is no fever or evidence of a urinary tract infection. The patient will be discharged with opioid analgesics and Ibuprofen for pain. Zofran will be prescribed for nausea.  I considered other etiologies for these symptoms including AAA and pyelonephritis but these are unlikely given the otherwise normal CT scan and urinalysis.  The patient is instructed to return if increasing pain not controlled with pain meds, vomiting, and fever. He was advised to follow up with urology as soon as possible. Strict return precautions were discussed.     Diagnosis:    ICD-10-CM    1. Ureteral calculi  N20.1 UA reflex to Microscopic and Culture       Discharge Medications:  New Prescriptions    IBUPROFEN (ADVIL/MOTRIN) 400 MG TABLET    Take 1 tablet (400 mg) by mouth every 6 hours as needed for pain    ONDANSETRON (ZOFRAN ODT) 4 MG ODT TAB    Take 1 tablet (4 mg) by mouth every 8 hours as needed for nausea    OXYCODONE (ROXICODONE) 5 MG TABLET    Take 1 tablet (5 mg) by mouth every 6 hours as needed for pain       Scribe Disclosure:  I, Hernandez Blackwood, am serving as a scribe at 9:04 PM on 1/6/2021 to document services  personally performed by Faina Jenkins PA-C based on my observations and the provider's statements to me.     [facility]         Faina Jenkins PA-C  01/06/21 7262

## 2021-01-07 NOTE — TELEPHONE ENCOUNTER
"\"I was seen 3 yrs ago for kidney stones, it seems like they have come back. Early this morning ~5am: I woke up to sharp pain in my side and lower back on the right side.\" He states that it feels similar to the pain he had 3 yrs ago, but not as intense. Currently pain =\"8\". He has taken Tylenol Extra Strength liquid which helped a little bit, and he applied a Lidocaine patch on his side: but it did not help. He states the pain is constant. A hot shower helped, but then the pain came back again. No fever noted.     Triaged to a disposition of Go to ED now. He intends to go to [facility] in [place] now.     Sheba Mar RN Triage Nurse Advisor 7:26 PM 1/6/2021    Reason for Disposition    [1] SEVERE pain (e.g., excruciating, scale 8-10) AND [2] present > 1 hour    [1] SEVERE pain (e.g., excruciating, scale 8-10) AND [2] not improved after pain medicine    Additional Information    Negative: Passed out (i.e., lost consciousness, collapsed and was not responding)    Negative: Shock suspected (e.g., cold/pale/clammy skin, too weak to stand, low BP, rapid pulse)    Negative: Difficult to awaken or acting confused (e.g., disoriented, slurred speech)    Negative: Sounds like a life-threatening emergency to the triager    Negative: Followed a major injury to the back (e.g., MVA, fall > 10 feet or 3 meters, penetrating injury, etc.)    Negative: Back pain or flank pain during pregnancy    Negative: Upper, mid or lower back pain that occurs mainly in the midline    Protocols used: FLANK PAIN-A-AH  COVID 19 Nurse Triage Plan/Patient Instructions    Please be aware that novel coronavirus (COVID-19) may be circulating in the community. If you develop symptoms such as fever, cough, or SOB or if you have concerns about the presence of another infection including coronavirus (COVID-19), please contact your health care provider or visit [email or web address].     Disposition/Instructions    ED Visit recommended. Follow protocol " based instructions.     Bring Your Own Device:  Please also bring your smart device(s) (smart phones, tablets, laptops) and their charging cables for your personal use and to communicate with your care team during your visit.    Thank you for taking steps to prevent the spread of this virus.  o Limit your contact with others.  o Wear a simple mask to cover your cough.  o Wash your hands well and often.    Resources    [facility]: About COVID-19: [email or web address]    CDC: What to Do If You're Sick: www.cdc.gov/coronavirus/2019-ncov/about/steps-when-sick.html    CDC: Ending Home Isolation: www.cdc.gov/coronavirus/2019-ncov/hcp/disposition-in-home-patients.html     CDC: Caring for Someone: www.cdc.gov/coronavirus/2019-ncov/if-you-are-sick/care-for-someone.html     [facility]: Interim Guidance for Hospital Discharge to Home: [email or web address]./diseases/coronavirus/hcp/hospdischarge.pdf    [facility] clinical trials (COVID-19 research studies): clinicalaffairs.[facility]-clinical-trials     Below are the COVID-19 hotlines at the [place] Department of Health ([facility]). Interpreters are available.   o For health questions: Call 204-287-4138 or 1-602.963.6063 (7 a.m. to 7 p.m.)  o For questions about schools and childcare: Call 820-421-2593 or 1-320.723.6075 (7 a.m. to 7 p.m.)

## 2021-01-07 NOTE — ED PROVIDER NOTES
Emergency Department Attending Supervision Note  1/6/2021  9:28 PM      I evaluated this patient in conjunction with Faina Jenkins PA-C      Briefly, the patient presented with acute flank pain      On my exam, No CVA tenderness, no abdominal tenderness. Reassuring vital signs    Results:  Labs Ordered and Resulted from Time of ED Arrival Up to the Time of Departure from the ED - No data to display    CT Abdomen Pelvis w/o Contrast   Final Result   IMPRESSION:    1.  Moderate right hydronephrosis secondary to a 9 x 6 mm proximal right ureteral stone.             Medications   0.9% sodium chloride BOLUS (has no administration in time range)     Followed by   sodium chloride 0.9% infusion (has no administration in time range)   ondansetron (ZOFRAN) injection 4 mg (has no administration in time range)   ketorolac (TORADOL) injection 15 mg (has no administration in time range)   oxyCODONE (ROXICODONE) tablet 5 mg (has no administration in time range)       ED course:    My impression is his symptom onset and description are pathognomonic for ureteral colic. His diagnostic evaluation is most c/w such. His pain was controlled, plan for outpatient urology follow up, precautions for which to return were discussed    Diagnosis    ICD-10-CM    1. Ureteral calculi  N20.1 UA reflex to Microscopic and Culture         Deon Pino Justin Zachary, PA-C  01/06/21 [id number]

## 2021-01-28 NOTE — ANESTHESIA POSTPROCEDURE EVALUATION
Left message to call back.   Patient: Russ Stevens    Procedure(s):  Right cystoscopy, right ureteroscopy  right stent removal and stone extraction.  Laser on standby.    Diagnosis:kidney stone  Diagnosis Additional Information: No value filed.    Anesthesia Type:  General, LMA    Note:  Anesthesia Post Evaluation    Patient location during evaluation: PACU  Patient participation: Able to fully participate in evaluation  Level of consciousness: awake and alert  Pain management: adequate  Airway patency: patent  Cardiovascular status: acceptable  Respiratory status: acceptable  Hydration status: acceptable  PONV: none     Anesthetic complications: None          Last vitals:  Vitals:    06/19/19 1040 06/19/19 1300 06/19/19 1305   BP: 121/83 (!) 128/92 (!) 143/98   Pulse:  93 100   Resp: 16 21 20   Temp: 97.8  F (36.6  C) 97.1  F (36.2  C)    SpO2: 97% 100% 99%         Electronically Signed By: Pelon Esqueda MD  June 19, 2019  1:14 PM

## 2021-09-18 ENCOUNTER — HEALTH MAINTENANCE LETTER (OUTPATIENT)
Age: 51
End: 2021-09-18

## 2022-01-08 ENCOUNTER — HEALTH MAINTENANCE LETTER (OUTPATIENT)
Age: 52
End: 2022-01-08

## 2022-07-18 NOTE — PLAN OF CARE
"PRIMARY DIAGNOSIS: s/p cysto with stent placement  OUTPATIENT/OBSERVATION GOALS TO BE MET BEFORE DISCHARGE:  1. Stable vital signs: Yes  2. Tolerating diet: Yes  3. Pain controlled with oral pain medications: Yes  4. Positive bowel sounds: Yes  5. Voiding without difficulty: Yes  6. Able to ambulate: Yes  7. Provider specific discharge goals met: Yes    Discharge Planner Nurse   Safe discharge environment identified: Yes  Barriers to discharge: No       Entered by: Jasmyn Givens 05/29/2019 8:29 PM     Please review provider order for any additional goals.   Nurse to notify provider when observation goals have been met and patient is ready for discharge.    /52 (BP Location: Left arm)   Pulse 76   Temp 97.5  F (36.4  C) (Oral)   Resp 20   Ht 1.753 m (5' 9\")   Wt 88.5 kg (195 lb)   SpO2 95%   BMI 28.80 kg/m      Pt denying pain, no concerns noted at this time. IV rocephin completed, plan to continue IVF and repeat BMP in AM.  " Patient/Caregiver provided printed discharge information.

## 2022-11-20 ENCOUNTER — HEALTH MAINTENANCE LETTER (OUTPATIENT)
Age: 52
End: 2022-11-20

## 2023-04-15 ENCOUNTER — HEALTH MAINTENANCE LETTER (OUTPATIENT)
Age: 53
End: 2023-04-15

## 2023-10-10 ENCOUNTER — LAB REQUISITION (OUTPATIENT)
Dept: LAB | Facility: CLINIC | Age: 53
End: 2023-10-10

## 2023-10-10 PROCEDURE — 86481 TB AG RESPONSE T-CELL SUSP: CPT

## 2023-10-12 ENCOUNTER — ALLIED HEALTH/NURSE VISIT (OUTPATIENT)
Dept: PEDIATRICS | Facility: CLINIC | Age: 53
End: 2023-10-12
Payer: COMMERCIAL

## 2023-10-12 DIAGNOSIS — Z23 ENCOUNTER FOR IMMUNIZATION: Primary | ICD-10-CM

## 2023-10-12 LAB
GAMMA INTERFERON BACKGROUND BLD IA-ACNC: 0 IU/ML
M TB IFN-G BLD-IMP: NEGATIVE
M TB IFN-G CD4+ BCKGRND COR BLD-ACNC: 10 IU/ML
MITOGEN IGNF BCKGRD COR BLD-ACNC: 0.01 IU/ML
MITOGEN IGNF BCKGRD COR BLD-ACNC: 0.02 IU/ML
QUANTIFERON MITOGEN: 10 IU/ML
QUANTIFERON NIL TUBE: 0 IU/ML
QUANTIFERON TB1 TUBE: 0.01 IU/ML
QUANTIFERON TB2 TUBE: 0.02

## 2023-10-12 PROCEDURE — 90471 IMMUNIZATION ADMIN: CPT

## 2023-10-12 PROCEDURE — 90682 RIV4 VACC RECOMBINANT DNA IM: CPT

## 2023-10-12 PROCEDURE — 91320 SARSCV2 VAC 30MCG TRS-SUC IM: CPT

## 2023-10-12 PROCEDURE — 90480 ADMN SARSCOV2 VAC 1/ONLY CMP: CPT

## 2023-10-12 PROCEDURE — 90472 IMMUNIZATION ADMIN EACH ADD: CPT

## 2023-10-12 PROCEDURE — 99207 PR NO CHARGE NURSE ONLY: CPT

## 2023-10-12 PROCEDURE — 90715 TDAP VACCINE 7 YRS/> IM: CPT

## 2023-10-12 NOTE — PROGRESS NOTES
Prior to immunization administration, verified patients identity using patient s name and date of birth. Please see Immunization Activity for additional information.     Screening Questionnaire for Adult Immunization    Are you sick today?   No   Do you have allergies to medications, food, a vaccine component or latex?   No   Have you ever had a serious reaction after receiving a vaccination?   No   Do you have a long-term health problem with heart, lung, kidney, or metabolic disease (e.g., diabetes), asthma, a blood disorder, no spleen, complement component deficiency, a cochlear implant, or a spinal fluid leak?  Are you on long-term aspirin therapy?   No   Do you have cancer, leukemia, HIV/AIDS, or any other immune system problem?   No   Do you have a parent, brother, or sister with an immune system problem?   No   In the past 3 months, have you taken medications that affect  your immune system, such as prednisone, other steroids, or anticancer drugs; drugs for the treatment of rheumatoid arthritis, Crohn s disease, or psoriasis; or have you had radiation treatments?   No   Have you had a seizure, or a brain or other nervous system problem?   No   During the past year, have you received a transfusion of blood or blood    products, or been given immune (gamma) globulin or antiviral drug?   No   For women: Are you pregnant or is there a chance you could become       pregnant during the next month?   No   Have you received any vaccinations in the past 4 weeks?   No     Immunization questionnaire answers were all negative.    I have reviewed the following standing orders:   This patient is due and qualifies for the Covid-19 vaccine.     Click here for COVID-19 Standing Order    I have reviewed the vaccines inclusion and exclusion criteria; No concerns regarding eligibility.     This patient is due and qualifies for the Influenza vaccine.    Click here for Influenza Vaccine Standing Order    I have reviewed the  vaccines inclusion and exclusion criteria; No concerns regarding eligibility.         This patient is due and qualifies for a TDAP vaccine.    Click here for Tdap Standing Order    I have reviewed the vaccines inclusion and exclusion criteria; No concerns regarding eligibility.     Patient instructed to remain in clinic for 15 minutes afterwards, and to report any adverse reactions.     Fax to [facility] [place]  [phone]      Screening performed by Karen Butts CMA on 10/12/2023 at 11:13 AM.

## 2023-10-17 ENCOUNTER — OFFICE VISIT (OUTPATIENT)
Dept: FAMILY MEDICINE | Facility: CLINIC | Age: 53
End: 2023-10-17
Payer: COMMERCIAL

## 2023-10-17 VITALS
RESPIRATION RATE: 16 BRPM | DIASTOLIC BLOOD PRESSURE: 88 MMHG | HEART RATE: 90 BPM | HEIGHT: 69 IN | WEIGHT: 222.6 LBS | BODY MASS INDEX: 32.97 KG/M2 | SYSTOLIC BLOOD PRESSURE: 127 MMHG | TEMPERATURE: 97.7 F | OXYGEN SATURATION: 94 %

## 2023-10-17 DIAGNOSIS — Z23 NEED FOR MEASLES-MUMPS-RUBELLA (MMR) VACCINE: ICD-10-CM

## 2023-10-17 DIAGNOSIS — Z92.29 HAS RECEIVED FIRST DOSE OF HEPATITIS B VACCINE: Primary | ICD-10-CM

## 2023-10-17 PROCEDURE — 90746 HEPB VACCINE 3 DOSE ADULT IM: CPT | Performed by: PHYSICIAN ASSISTANT

## 2023-10-17 PROCEDURE — 90707 MMR VACCINE SC: CPT | Performed by: PHYSICIAN ASSISTANT

## 2023-10-17 PROCEDURE — 99202 OFFICE O/P NEW SF 15 MIN: CPT | Mod: 25 | Performed by: PHYSICIAN ASSISTANT

## 2023-10-17 PROCEDURE — 90471 IMMUNIZATION ADMIN: CPT | Performed by: PHYSICIAN ASSISTANT

## 2023-10-17 PROCEDURE — 90472 IMMUNIZATION ADMIN EACH ADD: CPT | Performed by: PHYSICIAN ASSISTANT

## 2023-10-17 NOTE — PROGRESS NOTES
"  Assessment & Plan     Has received first dose of hepatitis B vaccine  Patient needs updated vaccines for immigration. Hepatitis B given today and should have remainder of the vaccine series given (1 month and 6 months).  - HEPATITIS B, ADULT 20+ (ENGERIX-B/RECOMBIVAX HB)    Need for measles-mumps-rubella (MMR) vaccine  MMR given today and should have the second shot in the series given in a month.  - MMR (M-M-R II)    Fax immunization record to [facility] [phone]    LEWIS Esqueda [facility] JAYE Solano is a 52 year old, presenting for the following health issues:  Imm/Inj (Pt needs covid 19, Hep B, influenza, MMR and Tdap for immigration. Hasn't been seen in clinic in over 3 years)         No data to display                Imm/Inj       Concern - patient needs several shots for immigration by the end of the month      Review of Systems   GENERAL:  No fevers      Objective    /88 (BP Location: Right arm, Patient Position: Sitting, Cuff Size: Adult Large)   Pulse 90   Temp 97.7  F (36.5  C) (Oral)   Resp 16   Ht 1.753 m (5' 9\")   Wt 101 kg (222 lb 9.6 oz)   SpO2 94%   BMI 32.87 kg/m    Body mass index is 32.87 kg/m .  Physical Exam   GENERAL: No acute distress  HEENT: Normocephalic, PERRL  CARDIAC: Regular rate and rhythm. No murmurs.  PULMONARY: Lungs are clear to auscultation bilaterally. No wheezes, rhonchi or crackles.  NEURO: Alert and non-focal                "

## 2023-10-17 NOTE — PROGRESS NOTES
Faxed Updated Immunization Record to [facility] [place] at [phone]. Patient informed.     Cora Rosales [facility]

## 2024-03-05 ENCOUNTER — OFFICE VISIT (OUTPATIENT)
Dept: FAMILY MEDICINE | Facility: CLINIC | Age: 54
End: 2024-03-05
Payer: COMMERCIAL

## 2024-03-05 VITALS
RESPIRATION RATE: 20 BRPM | WEIGHT: 217 LBS | TEMPERATURE: 98 F | SYSTOLIC BLOOD PRESSURE: 123 MMHG | HEART RATE: 82 BPM | HEIGHT: 70 IN | DIASTOLIC BLOOD PRESSURE: 87 MMHG | BODY MASS INDEX: 31.07 KG/M2 | OXYGEN SATURATION: 95 %

## 2024-03-05 DIAGNOSIS — J02.9 SORE THROAT: ICD-10-CM

## 2024-03-05 DIAGNOSIS — H10.33 ACUTE BACTERIAL CONJUNCTIVITIS OF BOTH EYES: ICD-10-CM

## 2024-03-05 DIAGNOSIS — M25.561 ACUTE PAIN OF RIGHT KNEE: Primary | ICD-10-CM

## 2024-03-05 PROCEDURE — 99214 OFFICE O/P EST MOD 30 MIN: CPT | Performed by: PHYSICIAN ASSISTANT

## 2024-03-05 RX ORDER — POLYMYXIN B SULFATE AND TRIMETHOPRIM 1; 10000 MG/ML; [USP'U]/ML
1-2 SOLUTION OPHTHALMIC EVERY 4 HOURS
Qty: 10 ML | Refills: 0 | Status: SHIPPED | OUTPATIENT
Start: 2024-03-05 | End: 2024-03-12

## 2024-03-05 RX ORDER — NAPROXEN 500 MG/1
500 TABLET ORAL 2 TIMES DAILY WITH MEALS
Qty: 60 TABLET | Refills: 0 | Status: SHIPPED | OUTPATIENT
Start: 2024-03-05 | End: 2024-04-01

## 2024-03-05 NOTE — PATIENT INSTRUCTIONS
Take Naproxen twice a day for 2-3 weeks. I can refer you to physical therapy if pain is persisting.    Use antibiotic drops for your eyes.        You can continue to take Tylenol or ibuprofen for pain and/or fever.    Use Cepacol drops or chloraseptic spray for sore throat.    You can gargle with warm salt water.     We will notify you if the strep culture is positive.    If not improving as expected, follow-up with primary care provider or sooner if worsening.

## 2024-03-05 NOTE — PROGRESS NOTES
Assessment & Plan     Acute pain of right knee    Trial Naproxen. OK to continue biofreeze. Can refer to PT if pain persists. Can check vitamin D if persisting as well.    - naproxen (NAPROSYN) 500 MG tablet; Take 1 tablet (500 mg) by mouth 2 times daily (with meals)      Acute bacterial conjunctivitis of both eyes    Uncertain if bacterial or viral but will treat with drops just in case.    - polymixin b-trimethoprim (POLYTRIM) 50487-2.1 UNIT/ML-% ophthalmic solution; Place 1-2 drops into both eyes every 4 hours for 7 days      Sore throat    Likely viral.                Fide Solano is a 53 year old, presenting for the following health issues:  URI      3/5/2024     7:29 AM   Additional Questions   Roomed by Melody GUTIERREZ    History of Present Illness       Reason for visit:  Pink eye, sore thoat, pain in leg  Symptom onset:  1-3 days ago    He eats 2-3 servings of fruits and vegetables daily.He consumes 0 sweetened beverage(s) daily.He exercises with enough effort to increase his heart rate 20 to 29 minutes per day.  He exercises with enough effort to increase his heart rate 4 days per week.   He is taking medications regularly.     Symptoms: Started 2 days ago  Fever: No  Chills/Sweats: No  Headache (location?): No  Sinus Pressure: No  Conjunctivitis:  YES- both eyes red. Has some crusting in the mornings. Itchy.  Ear Pain: no  Rhinorrhea: No  Congestion: No  Sore Throat: YES- mild pain with swallowing, itchiness  Cough: no  Wheeze: No  Decreased Appetite: No  Nausea: No  Vomiting: No  Diarrhea: No  Dysuria/Freq.: No  Dysuria or Hematuria: No  Fatigue/Achiness: YES  Sick/Strep Exposure: No  Therapies tried and outcome: salt water gargle helped with sore throat      Right knee pain for 2-3 weeks. Did roll his ankle when this started. Was walking differently (on his toes more) which seems to have caused this pain. Has tried biofreeze which helped a little. Also wearing a knee sleeve which helps  "some.      Review of Systems  Constitutional, HEENT, cardiovascular, pulmonary, gi and gu systems are negative, except as otherwise noted.        Objective    /87 (BP Location: Right arm, Patient Position: Chair, Cuff Size: Adult Large)   Pulse 82   Temp 98  F (36.7  C) (Oral)   Resp 20   Ht 1.765 m (5' 9.5\")   Wt 98.4 kg (217 lb)   SpO2 95%   BMI 31.59 kg/m    Body mass index is 31.59 kg/m .      Physical Exam   GENERAL: alert and no distress  EYES: PERRL, EOMI, and conjunctiva/corneas- conjunctival injection OU  HENT: ear canals and TM's normal, nose and mouth without ulcers or lesions  RESP: lungs clear to auscultation - no rales, rhonchi or wheezes  CV: regular rate and rhythm, normal S1 S2, no S3 or S4, no murmur, click or rub, no peripheral edema  MS: no gross musculoskeletal defects noted, no edema  ORTHO: Right Knee Exam: Inspection: No effusion, No quad atrophy  Tender: non-tender  Non-tender: lateral patellar facet, medial patellar facet, inferior pole patella, patella tendon, quadriceps insertion, MCL, LCL, lateral joint line, medial joint line, medial tibial plateau, lateral tibial plateau, medial femoral condyle, lateral femoral condyle, distal IT band, prepatellar bursa, popliteal region, pes anserine bursa  Active Range of Motion: full flexion, no pain with flexion, full extension, no pain with extension  Strength: quad  5/5 and Hamstrings  5/5  Special tests: normal Valgus stress test, normal Varus, negative posterior drawer, negative Ventura's , no apprehension with lateral stress of the patella    Also examined: hip not tested   SKIN: no suspicious lesions or rashes  NEURO: Normal strength and tone, mentation intact and speech normal  PSYCH: mentation appears normal, affect normal/bright            Signed Electronically by: Aidan Fung PA-C    "

## 2024-04-01 DIAGNOSIS — M25.561 ACUTE PAIN OF RIGHT KNEE: ICD-10-CM

## 2024-04-01 RX ORDER — NAPROXEN 500 MG/1
500 TABLET ORAL 2 TIMES DAILY WITH MEALS
Qty: 60 TABLET | Refills: 0 | Status: SHIPPED | OUTPATIENT
Start: 2024-04-01

## 2024-06-22 ENCOUNTER — HEALTH MAINTENANCE LETTER (OUTPATIENT)
Age: 54
End: 2024-06-22

## 2024-09-05 NOTE — ED AVS SNAPSHOT
[facility]  [address]  [facility] [address]  Phone: [phone]  Fax: [phone]                                    Russ Stevens   MRN: [id number]    Department: [facility]   Date of Visit: 1/6/2021           After Visit Summary Signature Page    I have received my discharge instructions, and my questions have been answered. I have discussed any challenges I see with this plan with the nurse or doctor.    ..........................................................................................................................................  Patient/Patient Representative Signature      ..........................................................................................................................................  Patient Representative Print Name and Relationship to Patient    ..................................................               ................................................  Date                                   Time    ..........................................................................................................................................  Reviewed by Signature/Title    ...................................................              ..............................................  Date                                               Time          22EPIC Rev 08/18        [FreeTextEntry1] : I, Jhony Franks Jr, acted solely as a scribe for Dr. Hétcor Webb on this date 09/05/2024  All medical record entries made by the Scribe were at my, Dr. Héctor Webb, direction and personally dictated by me on 09/05/2024 . I have reviewed the chart and agree that the record accurately reflects my personal performance of the history, physical exam, assessment and plan. I have also personally directed, reviewed, and agreed with the chart.

## 2025-07-12 ENCOUNTER — HEALTH MAINTENANCE LETTER (OUTPATIENT)
Age: 55
End: 2025-07-12

## (undated) DEVICE — TUBING IRRIG TUR Y TYPE 96" LF 6543-01

## (undated) DEVICE — GLOVE PROTEXIS POWDER FREE SMT 7.5  2D72PT75X

## (undated) DEVICE — LINEN FULL SHEET 5511

## (undated) DEVICE — GUIDEWIRE URO STR STIFF .035"X150CM NITINOL 150NSS35

## (undated) DEVICE — PACK CYSTO CUSTOM RIDGES

## (undated) DEVICE — SYR 30ML LL W/O NDL 302832

## (undated) DEVICE — RAD RX ISOVUE 300 (50ML) 61% IOPAMIDOL CHARGE PER ML

## (undated) DEVICE — SOL WATER IRRIG 1000ML BOTTLE 2F7114

## (undated) DEVICE — BAG CLEAR TRASH 1.3M 39X33" P4040C

## (undated) DEVICE — COVER FOOTSWITCH W/CINCH 20X24" 923267

## (undated) DEVICE — LINEN HALF SHEET 5512

## (undated) DEVICE — SOL NACL 0.9% IRRIG 3000ML BAG 2B7477

## (undated) DEVICE — CATH URETERAL FLEX TIP TIGERTAIL 06FRX70CM 139006

## (undated) DEVICE — PREP TECHNI-CARE CHLOROXYLENOL 3% 4OZ BOTTLE C222-4ZWO

## (undated) DEVICE — BASKET RETRIEVAL 1.9FRX120CM ESCAPE NTNL 4 WIRE 390-201

## (undated) RX ORDER — ONDANSETRON 2 MG/ML
INJECTION INTRAMUSCULAR; INTRAVENOUS
Status: DISPENSED
Start: 2019-06-19

## (undated) RX ORDER — LIDOCAINE HYDROCHLORIDE 10 MG/ML
INJECTION, SOLUTION EPIDURAL; INFILTRATION; INTRACAUDAL; PERINEURAL
Status: DISPENSED
Start: 2019-06-19

## (undated) RX ORDER — PROPOFOL 10 MG/ML
INJECTION, EMULSION INTRAVENOUS
Status: DISPENSED
Start: 2019-05-29

## (undated) RX ORDER — CEFAZOLIN SODIUM 2 G/100ML
INJECTION, SOLUTION INTRAVENOUS
Status: DISPENSED
Start: 2019-05-29

## (undated) RX ORDER — FENTANYL CITRATE 50 UG/ML
INJECTION, SOLUTION INTRAMUSCULAR; INTRAVENOUS
Status: DISPENSED
Start: 2019-06-19

## (undated) RX ORDER — DEXAMETHASONE SODIUM PHOSPHATE 4 MG/ML
INJECTION, SOLUTION INTRA-ARTICULAR; INTRALESIONAL; INTRAMUSCULAR; INTRAVENOUS; SOFT TISSUE
Status: DISPENSED
Start: 2019-06-19

## (undated) RX ORDER — GLYCOPYRROLATE 0.2 MG/ML
INJECTION INTRAMUSCULAR; INTRAVENOUS
Status: DISPENSED
Start: 2019-05-29

## (undated) RX ORDER — KETOROLAC TROMETHAMINE 30 MG/ML
INJECTION, SOLUTION INTRAMUSCULAR; INTRAVENOUS
Status: DISPENSED
Start: 2019-06-19

## (undated) RX ORDER — PROPOFOL 10 MG/ML
INJECTION, EMULSION INTRAVENOUS
Status: DISPENSED
Start: 2019-06-19

## (undated) RX ORDER — GLYCOPYRROLATE 0.2 MG/ML
INJECTION INTRAMUSCULAR; INTRAVENOUS
Status: DISPENSED
Start: 2019-06-19

## (undated) RX ORDER — FENTANYL CITRATE 50 UG/ML
INJECTION, SOLUTION INTRAMUSCULAR; INTRAVENOUS
Status: DISPENSED
Start: 2019-05-29

## (undated) RX ORDER — CEFAZOLIN SODIUM 2 G/100ML
INJECTION, SOLUTION INTRAVENOUS
Status: DISPENSED
Start: 2019-06-19

## (undated) RX ORDER — ACETAMINOPHEN 325 MG/1
TABLET ORAL
Status: DISPENSED
Start: 2019-06-19

## (undated) RX ORDER — LIDOCAINE HYDROCHLORIDE 10 MG/ML
INJECTION, SOLUTION EPIDURAL; INFILTRATION; INTRACAUDAL; PERINEURAL
Status: DISPENSED
Start: 2019-05-29

## (undated) RX ORDER — DEXAMETHASONE SODIUM PHOSPHATE 4 MG/ML
INJECTION, SOLUTION INTRA-ARTICULAR; INTRALESIONAL; INTRAMUSCULAR; INTRAVENOUS; SOFT TISSUE
Status: DISPENSED
Start: 2019-05-29